# Patient Record
Sex: FEMALE | Race: WHITE | NOT HISPANIC OR LATINO | Employment: FULL TIME | URBAN - METROPOLITAN AREA
[De-identification: names, ages, dates, MRNs, and addresses within clinical notes are randomized per-mention and may not be internally consistent; named-entity substitution may affect disease eponyms.]

---

## 2017-08-04 ENCOUNTER — TRANSCRIBE ORDERS (OUTPATIENT)
Dept: ULTRASOUND IMAGING | Facility: HOSPITAL | Age: 42
End: 2017-08-04

## 2017-08-04 DIAGNOSIS — Z12.31 VISIT FOR SCREENING MAMMOGRAM: Primary | ICD-10-CM

## 2017-08-18 ENCOUNTER — HOSPITAL ENCOUNTER (OUTPATIENT)
Dept: MAMMOGRAPHY | Facility: HOSPITAL | Age: 42
Discharge: HOME | End: 2017-08-18
Attending: OBSTETRICS & GYNECOLOGY | Admitting: OBSTETRICS & GYNECOLOGY

## 2017-08-18 DIAGNOSIS — Z12.31 VISIT FOR SCREENING MAMMOGRAM: ICD-10-CM

## 2017-08-18 PROCEDURE — 77063 BREAST TOMOSYNTHESIS BI: CPT

## 2017-08-18 PROCEDURE — 77067 SCR MAMMO BI INCL CAD: CPT | Performed by: RADIOLOGY

## 2017-08-18 PROCEDURE — G0202 SCR MAMMO BI INCL CAD: HCPCS

## 2017-08-18 PROCEDURE — 77063 BREAST TOMOSYNTHESIS BI: CPT | Performed by: RADIOLOGY

## 2018-07-17 ENCOUNTER — TRANSCRIBE ORDERS (OUTPATIENT)
Dept: ADMINISTRATIVE | Facility: HOSPITAL | Age: 43
End: 2018-07-17

## 2018-07-17 DIAGNOSIS — Z12.31 VISIT FOR SCREENING MAMMOGRAM: Primary | ICD-10-CM

## 2018-08-27 ENCOUNTER — HOSPITAL ENCOUNTER (OUTPATIENT)
Dept: MAMMOGRAPHY | Facility: HOSPITAL | Age: 43
Discharge: HOME | End: 2018-08-27
Attending: OBSTETRICS & GYNECOLOGY | Admitting: OBSTETRICS & GYNECOLOGY

## 2018-08-27 DIAGNOSIS — Z12.31 VISIT FOR SCREENING MAMMOGRAM: ICD-10-CM

## 2018-08-27 PROCEDURE — 77063 BREAST TOMOSYNTHESIS BI: CPT | Performed by: RADIOLOGY

## 2018-08-27 PROCEDURE — 77063 BREAST TOMOSYNTHESIS BI: CPT

## 2018-08-27 PROCEDURE — 77067 SCR MAMMO BI INCL CAD: CPT | Performed by: RADIOLOGY

## 2018-08-27 PROCEDURE — 77067 SCR MAMMO BI INCL CAD: CPT

## 2019-07-29 ENCOUNTER — TRANSCRIBE ORDERS (OUTPATIENT)
Dept: ADMINISTRATIVE | Facility: HOSPITAL | Age: 44
End: 2019-07-29

## 2019-07-29 DIAGNOSIS — Z12.31 VISIT FOR SCREENING MAMMOGRAM: Primary | ICD-10-CM

## 2019-09-27 ENCOUNTER — HOSPITAL ENCOUNTER (OUTPATIENT)
Dept: MAMMOGRAPHY | Facility: HOSPITAL | Age: 44
Discharge: HOME | End: 2019-09-27
Admitting: OBSTETRICS & GYNECOLOGY

## 2019-09-27 DIAGNOSIS — Z12.31 VISIT FOR SCREENING MAMMOGRAM: ICD-10-CM

## 2019-09-27 PROCEDURE — 77067 SCR MAMMO BI INCL CAD: CPT

## 2019-09-27 PROCEDURE — 77063 BREAST TOMOSYNTHESIS BI: CPT

## 2019-09-27 PROCEDURE — 77067 SCR MAMMO BI INCL CAD: CPT | Performed by: RADIOLOGY

## 2019-09-27 PROCEDURE — 77063 BREAST TOMOSYNTHESIS BI: CPT | Performed by: RADIOLOGY

## 2020-08-19 ENCOUNTER — TRANSCRIBE ORDERS (OUTPATIENT)
Dept: ADMINISTRATIVE | Facility: HOSPITAL | Age: 45
End: 2020-08-19

## 2020-08-19 DIAGNOSIS — Z12.31 VISIT FOR SCREENING MAMMOGRAM: Primary | ICD-10-CM

## 2020-08-21 ENCOUNTER — TRANSCRIBE ORDERS (OUTPATIENT)
Dept: ADMINISTRATIVE | Facility: HOSPITAL | Age: 45
End: 2020-08-21

## 2020-12-10 ENCOUNTER — APPOINTMENT (OUTPATIENT)
Dept: MAMMOGRAPHY | Facility: HOSPITAL | Age: 45
End: 2020-12-10

## 2021-03-01 ENCOUNTER — OFFICE VISIT (OUTPATIENT)
Dept: OBSTETRICS AND GYNECOLOGY | Facility: CLINIC | Age: 46
End: 2021-03-01

## 2021-03-01 VITALS
SYSTOLIC BLOOD PRESSURE: 120 MMHG | DIASTOLIC BLOOD PRESSURE: 72 MMHG | BODY MASS INDEX: 28.56 KG/M2 | WEIGHT: 182 LBS | HEIGHT: 67 IN

## 2021-03-01 DIAGNOSIS — N92.6 IRREGULAR PERIODS: ICD-10-CM

## 2021-03-01 DIAGNOSIS — Z01.419 WOMEN'S ANNUAL ROUTINE GYNECOLOGICAL EXAMINATION: Primary | ICD-10-CM

## 2021-03-01 DIAGNOSIS — Z80.3 FAMILY HISTORY OF BREAST CANCER: ICD-10-CM

## 2021-03-01 DIAGNOSIS — Z30.011 ENCOUNTER FOR INITIAL PRESCRIPTION OF CONTRACEPTIVE PILLS: ICD-10-CM

## 2021-03-01 DIAGNOSIS — Z12.31 BREAST CANCER SCREENING BY MAMMOGRAM: ICD-10-CM

## 2021-03-01 PROBLEM — Z97.5 IUD (INTRAUTERINE DEVICE) IN PLACE: Status: ACTIVE | Noted: 2021-03-01

## 2021-03-01 PROBLEM — Z86.69 HISTORY OF MIGRAINE WITH AURA: Status: ACTIVE | Noted: 2021-03-01

## 2021-03-01 PROBLEM — Z97.5 IUD (INTRAUTERINE DEVICE) IN PLACE: Status: RESOLVED | Noted: 2021-03-01 | Resolved: 2021-03-01

## 2021-03-01 PROCEDURE — 99396 PREV VISIT EST AGE 40-64: CPT | Performed by: OBSTETRICS & GYNECOLOGY

## 2021-03-01 PROCEDURE — 99213 OFFICE O/P EST LOW 20 MIN: CPT | Performed by: OBSTETRICS & GYNECOLOGY

## 2021-03-01 RX ORDER — LEVOTHYROXINE SODIUM 100 UG/1
100 TABLET ORAL DAILY
COMMUNITY
Start: 2021-02-11 | End: 2022-06-06

## 2021-03-01 RX ORDER — DROSPIRENONE 4 MG/1
1 TABLET, FILM COATED ORAL DAILY
Qty: 84 TABLET | Refills: 4 | Status: SHIPPED | OUTPATIENT
Start: 2021-03-01 | End: 2022-03-02 | Stop reason: SDUPTHER

## 2021-03-01 RX ORDER — OMEPRAZOLE 40 MG/1
CAPSULE, DELAYED RELEASE ORAL
COMMUNITY
Start: 2021-02-11 | End: 2022-06-21 | Stop reason: SDUPTHER

## 2021-03-01 RX ORDER — ERGOCALCIFEROL 1.25 MG/1
50000 CAPSULE ORAL WEEKLY
COMMUNITY
Start: 2021-02-11 | End: 2022-06-17

## 2021-03-01 NOTE — PROGRESS NOTES
GYN Annual Exam     CC - Here for annual exam.        HPI  Aleksandra Raymond is a 45 y.o. female, , who presents for annual well woman exam. Patient's last menstrual period was 2021.  Periods are irregular occurring every 3-8 weeks, lasting 5 days. Heavy for the first 3 days.  Dysmenorrhea:severe, occurring first 1-2 days of flow.  Patient reports problems with: irregular periods and dysmenorrhea. She also wants to discuss an ingrown hair in the pelvic region that she can't get rid of. She states her PCP has evaluated it in Dec but it is still there. There were no changes to her medical or surgical history since her last visit.. Partner Status: Marital Status: .  New Partners since last visit: no.  Desires STD Screening: no.    Additional OB/GYN History   Current contraception: contraceptive methods: Withdrawal   Desires to: start contraception. She has a history of Migraines. She was on Kyleena but was removed last year due to AUB. She is interested in slynd  Last Pap :   Last Completed Pap Smear       Status Date      PAP SMEAR Done 2020 negative        History of abnormal Pap smear: yes - a long time ago  Family history of uterine, colon, breast, or ovarian cancer: yes - mother and maternal aunt had breast cancer  Performs monthly Self-Breast Exam: no  Last mammogram:   Last Completed Mammogram     Patient has no health maintenance due at this time         Exercises Regularly: no  Feelings of Anxiety or Depression: no  Tobacco Usage?: No   OB History        1    Para   1    Term   1            AB        Living           SAB        TAB        Ectopic        Molar        Multiple        Live Births                    Health Maintenance   Topic Date Due   • Annual Gynecologic Pelvic and Breast Exam  1973   • COLONOSCOPY  1973   • ANNUAL PHYSICAL  1976   • TDAP/TD VACCINES (1 - Tdap) 1992   • INFLUENZA VACCINE  2020   • HEPATITIS C SCREENING   "03/01/2021   • PAP SMEAR  02/26/2023   • Pneumococcal Vaccine 0-64  Aged Out   • MENINGOCOCCAL VACCINE  Aged Out       The additional following portions of the patient's history were reviewed and updated as appropriate: allergies, current medications, past family history, past medical history, past social history, past surgical history and problem list.    Review of Systems   Constitutional: Negative.    HENT: Negative.    Eyes: Negative.    Respiratory: Negative.    Cardiovascular: Negative.    Gastrointestinal: Negative.    Endocrine: Negative.    Genitourinary: Positive for menstrual problem.   Musculoskeletal: Negative.    Skin: Positive for skin lesions.   Allergic/Immunologic: Negative.    Neurological: Negative.    Hematological: Negative.    Psychiatric/Behavioral: Negative.      All other systems reviewed and are negative.     I have reviewed and agree with the HPI, ROS, and historical information as entered above. Elda Byrnes MD    Objective   /72   Ht 170.2 cm (67\")   Wt 82.6 kg (182 lb)   LMP 01/23/2021   BMI 28.51 kg/m²     Physical Exam  Vitals signs and nursing note reviewed. Exam conducted with a chaperone present.   Constitutional:       Appearance: She is well-developed.   HENT:      Head: Normocephalic and atraumatic.   Neck:      Musculoskeletal: Normal range of motion. No muscular tenderness.      Thyroid: No thyroid mass or thyromegaly.   Cardiovascular:      Rate and Rhythm: Normal rate and regular rhythm.      Heart sounds: No murmur.   Pulmonary:      Effort: Pulmonary effort is normal. No retractions.      Breath sounds: Normal breath sounds. No wheezing, rhonchi or rales.   Chest:      Chest wall: No mass or tenderness.      Breasts:         Right: Normal. No mass, nipple discharge, skin change or tenderness.         Left: Normal. No mass, nipple discharge, skin change or tenderness.   Abdominal:      General: Bowel sounds are normal.      Palpations: Abdomen is soft. " Abdomen is not rigid. There is no mass.      Tenderness: There is no abdominal tenderness. There is no guarding.      Hernia: No hernia is present. There is no hernia in the left inguinal area or right inguinal area.   Genitourinary:     General: Normal vulva.      Exam position: Lithotomy position.      Pubic Area: No rash.       Labia:         Right: No rash, tenderness or lesion.         Left: No rash, tenderness or lesion.       Urethra: No urethral pain or urethral swelling.      Vagina: Normal. No vaginal discharge or lesions.      Cervix: No cervical motion tenderness, discharge, lesion or cervical bleeding.      Uterus: Normal. Not enlarged, not fixed and not tender.       Adnexa:         Right: No mass, tenderness or fullness.          Left: No mass, tenderness or fullness.        Rectum: No external hemorrhoid.          Comments: Scarred area from history of ingrown pubic hair.  No erythema or drainage today.  Neurological:      Mental Status: She is alert and oriented to person, place, and time.   Psychiatric:         Behavior: Behavior normal.            Assessment and Plan    Problem List Items Addressed This Visit        Family History    Family history of breast cancer    Overview     Diagnosed in patient's mother at age 48 and then patient's maternal aunt and age 52  Patient has mammograms at [facility]            Genitourinary and Reproductive     Women's annual routine gynecological examination - Primary    Overview     3/1/2021-Pap smear completed         Relevant Orders    Pap IG, HPV-hr    Irregular periods    Overview     Patient reports she had oligomenorrhea as a teenager up until about age 25.  She required Clomid to get pregnant.  Following that she had a D&C for a miscarriage at age 27.  From that time on she has had regular cycles until this past year.  She does have a history of thyroid dysfunction for which she is on medicine however it has not been tested since June.  We  will get labs today and cycle on progestin only pills secondary to history of migraine with aura.In 2020 patient developed irregular periods ranging from 3 to 8 weeks apart.         Relevant Medications    Drospirenone (Slynd) 4 MG tablet    Other Relevant Orders    TSH    Prolactin    Testosterone    Hemoglobin A1c    CBC & Differential      Other Visit Diagnoses     Breast cancer screening by mammogram        Relevant Orders    Mammo Screening Digital Tomosynthesis Bilateral With CAD    Encounter for initial prescription of contraceptive pills        Relevant Medications    Drospirenone (Slynd) 4 MG tablet          1. GYN annual well woman exam.   2. Encouraged use of condoms for STD prevention.  3. Fibrocystic breast changes - Encouraged decreasing caffeine, supportive bra, low dose vitamin E supplementation.  4. Reviewed monthly self breast exams.  Instructed to call with lumps, pain, or breast discharge.    5. Ordered Mammogram today  6. Recommended use of Vitamin D replacement and getting adequate calcium in her diet. (1500mg)  7. Reviewed BMI and weight loss as preventative health measures.   8. Reviewed exercise as a preventative health measures.   9. Reccommended Flu Vaccine in Fall of each year.  10. Symptoms of menopausal transition reviewed with patient.     Elda Byrnes MD  03/01/2021

## 2021-03-02 ENCOUNTER — HOSPITAL ENCOUNTER (OUTPATIENT)
Dept: MAMMOGRAPHY | Facility: HOSPITAL | Age: 46
Discharge: HOME | End: 2021-03-02
Admitting: OBSTETRICS & GYNECOLOGY

## 2021-03-02 ENCOUNTER — TELEPHONE (OUTPATIENT)
Dept: OBSTETRICS AND GYNECOLOGY | Facility: CLINIC | Age: 46
End: 2021-03-02

## 2021-03-02 DIAGNOSIS — Z12.31 VISIT FOR SCREENING MAMMOGRAM: ICD-10-CM

## 2021-03-02 LAB
BASOPHILS # BLD AUTO: 0.09 10*3/MM3 (ref 0–0.2)
BASOPHILS NFR BLD AUTO: 0.8 % (ref 0–1.5)
EOSINOPHIL # BLD AUTO: 0.22 10*3/MM3 (ref 0–0.4)
EOSINOPHIL NFR BLD AUTO: 2 % (ref 0.3–6.2)
ERYTHROCYTE [DISTWIDTH] IN BLOOD BY AUTOMATED COUNT: 13.8 % (ref 12.3–15.4)
HBA1C MFR BLD: 6.8 % (ref 4.8–5.6)
HCT VFR BLD AUTO: 38.3 % (ref 34–46.6)
HGB BLD-MCNC: 13.2 G/DL (ref 12–15.9)
IMM GRANULOCYTES # BLD AUTO: 0.12 10*3/MM3 (ref 0–0.05)
IMM GRANULOCYTES NFR BLD AUTO: 1.1 % (ref 0–0.5)
LYMPHOCYTES # BLD AUTO: 3.53 10*3/MM3 (ref 0.7–3.1)
LYMPHOCYTES NFR BLD AUTO: 32.4 % (ref 19.6–45.3)
MCH RBC QN AUTO: 28.4 PG (ref 26.6–33)
MCHC RBC AUTO-ENTMCNC: 34.5 G/DL (ref 31.5–35.7)
MCV RBC AUTO: 82.4 FL (ref 79–97)
MONOCYTES # BLD AUTO: 0.71 10*3/MM3 (ref 0.1–0.9)
MONOCYTES NFR BLD AUTO: 6.5 % (ref 5–12)
NEUTROPHILS # BLD AUTO: 6.24 10*3/MM3 (ref 1.7–7)
NEUTROPHILS NFR BLD AUTO: 57.2 % (ref 42.7–76)
NRBC BLD AUTO-RTO: 0 /100 WBC (ref 0–0.2)
PLATELET # BLD AUTO: 278 10*3/MM3 (ref 140–450)
PROLACTIN SERPL-MCNC: 7.5 NG/ML (ref 4.8–23.3)
RBC # BLD AUTO: 4.65 10*6/MM3 (ref 3.77–5.28)
TESTOST SERPL-MCNC: <3 NG/DL (ref 8–48)
TSH SERPL DL<=0.005 MIU/L-ACNC: 4.28 UIU/ML (ref 0.27–4.2)
WBC # BLD AUTO: 10.91 10*3/MM3 (ref 3.4–10.8)

## 2021-03-02 PROCEDURE — 77063 BREAST TOMOSYNTHESIS BI: CPT | Performed by: RADIOLOGY

## 2021-03-02 PROCEDURE — 77067 SCR MAMMO BI INCL CAD: CPT | Performed by: RADIOLOGY

## 2021-03-02 PROCEDURE — 77063 BREAST TOMOSYNTHESIS BI: CPT

## 2021-03-02 PROCEDURE — 77067 SCR MAMMO BI INCL CAD: CPT

## 2021-03-02 NOTE — TELEPHONE ENCOUNTER
Patient received call about labs with morning but was not able to answer. Returning call to go over the results with a nurse.

## 2021-03-02 NOTE — TELEPHONE ENCOUNTER
Reviewed recent labs with patient. She will follow up with pcp.     She has access to my chart to view results.

## 2021-03-02 NOTE — PROGRESS NOTES
Explained to patient that her labs are consistent with diabetes and concern for thyroid dysfunction as well.  She needs to follow-up with her primary care physician.  Please send a copy of her labs to her PCP.

## 2021-03-04 DIAGNOSIS — Z01.419 WOMEN'S ANNUAL ROUTINE GYNECOLOGICAL EXAMINATION: ICD-10-CM

## 2021-03-16 ENCOUNTER — APPOINTMENT (OUTPATIENT)
Dept: MAMMOGRAPHY | Facility: HOSPITAL | Age: 46
End: 2021-03-16

## 2021-05-14 ENCOUNTER — HOSPITAL ENCOUNTER (OUTPATIENT)
Dept: ULTRASOUND IMAGING | Facility: HOSPITAL | Age: 46
Discharge: HOME | End: 2021-05-14

## 2021-05-14 ENCOUNTER — HOSPITAL ENCOUNTER (OUTPATIENT)
Dept: MAMMOGRAPHY | Facility: HOSPITAL | Age: 46
Discharge: HOME | End: 2021-05-14

## 2021-05-14 DIAGNOSIS — R92.8 ABNORMAL MAMMOGRAM: ICD-10-CM

## 2021-05-14 PROCEDURE — 77062 BREAST TOMOSYNTHESIS BI: CPT | Performed by: RADIOLOGY

## 2021-05-14 PROCEDURE — G0279 TOMOSYNTHESIS, MAMMO: HCPCS

## 2021-05-14 PROCEDURE — 77066 DX MAMMO INCL CAD BI: CPT

## 2021-05-14 PROCEDURE — 76642 ULTRASOUND BREAST LIMITED: CPT

## 2021-05-14 PROCEDURE — 77066 DX MAMMO INCL CAD BI: CPT | Performed by: RADIOLOGY

## 2021-05-14 PROCEDURE — 76642 ULTRASOUND BREAST LIMITED: CPT | Performed by: RADIOLOGY

## 2022-03-02 ENCOUNTER — OFFICE VISIT (OUTPATIENT)
Dept: OBSTETRICS AND GYNECOLOGY | Facility: CLINIC | Age: 47
End: 2022-03-02

## 2022-03-02 VITALS
HEIGHT: 67 IN | BODY MASS INDEX: 24.01 KG/M2 | SYSTOLIC BLOOD PRESSURE: 106 MMHG | DIASTOLIC BLOOD PRESSURE: 70 MMHG | WEIGHT: 153 LBS

## 2022-03-02 DIAGNOSIS — N92.6 IRREGULAR PERIODS: ICD-10-CM

## 2022-03-02 DIAGNOSIS — Z12.31 BREAST CANCER SCREENING BY MAMMOGRAM: ICD-10-CM

## 2022-03-02 DIAGNOSIS — Z01.419 WOMEN'S ANNUAL ROUTINE GYNECOLOGICAL EXAMINATION: Primary | ICD-10-CM

## 2022-03-02 DIAGNOSIS — Z30.011 ENCOUNTER FOR INITIAL PRESCRIPTION OF CONTRACEPTIVE PILLS: ICD-10-CM

## 2022-03-02 PROCEDURE — 99396 PREV VISIT EST AGE 40-64: CPT | Performed by: OBSTETRICS & GYNECOLOGY

## 2022-03-02 PROCEDURE — 99213 OFFICE O/P EST LOW 20 MIN: CPT | Performed by: OBSTETRICS & GYNECOLOGY

## 2022-03-02 RX ORDER — DROSPIRENONE 4 MG/1
1 TABLET, FILM COATED ORAL DAILY
Qty: 84 TABLET | Refills: 4 | Status: SHIPPED | OUTPATIENT
Start: 2022-03-02 | End: 2023-03-07 | Stop reason: SDUPTHER

## 2022-03-02 NOTE — PROGRESS NOTES
GYN Annual Exam     CC - Here for annual exam.        HPI  Aleksandra Raymond is a 46 y.o. female, , who presents for annual well woman exam.  She is perimenopausal.  Periods are rare, lasting 4 days. .  Dysmenorrhea:none..  Patient reports problems with: sleeplessness and night sweats. There were no changes to her medical or surgical history since her last visit.. Partner Status: Marital Status: .  New Partners since last visit: no.      Additional OB/GYN History   Current contraception: contraceptive methods: Oral progesterone-only contraceptive  Desires to: continue contraception    Last Pap : 3/4/21. Results: negative  Last Completed Pap Smear          PAP SMEAR (Every 3 Years) Next due on 3/4/2024    2021  Pap IG, HPV-hr    2020  Done - negative              History of abnormal Pap smear: yes - years ago - patient states an ablation was done  Family history of uterine, colon, breast, or ovarian cancer: yes - mother and maternal aunt had breast cancer  Performs monthly Self-Breast Exam: no  Last mammogram: 21. Done at [facility]. Normal    Last Completed Mammogram     This patient has no relevant Health Maintenance data.        Last colonoscopy:   Last Completed Colonoscopy     This patient has no relevant Health Maintenance data.        Last DEXA: None  Exercises Regularly: yes  Feelings of Anxiety or Depression: no      Tobacco Usage?: No   OB History        1    Para   1    Term   1            AB        Living           SAB        IAB        Ectopic        Molar        Multiple        Live Births                    Health Maintenance   Topic Date Due   • COLORECTAL CANCER SCREENING  Never done   • ANNUAL PHYSICAL  Never done   • TDAP/TD VACCINES (1 - Tdap) Never done   • HEPATITIS C SCREENING  Never done   • INFLUENZA VACCINE  Never done   • Annual Gynecologic Pelvic and Breast Exam  2022   • PAP SMEAR  2024   • COVID-19 Vaccine  Completed   • Pneumococcal  "Vaccine 0-64  Aged Out       The additional following portions of the patient's history were reviewed and updated as appropriate: allergies, current medications, past family history, past medical history, past social history, past surgical history and problem list.    Review of Systems   Constitutional: Negative.    HENT: Negative.    Eyes: Negative.    Respiratory: Negative.    Cardiovascular: Negative.    Gastrointestinal: Negative.    Endocrine: Negative.    Genitourinary: Negative.    Musculoskeletal: Negative.    Skin: Negative.    Allergic/Immunologic: Negative.    Neurological: Negative.    Hematological: Negative.    Psychiatric/Behavioral: Positive for sleep disturbance.       I have reviewed and agree with the HPI, ROS, and historical information as entered above. Elda Byrnes MD    Objective   /70   Ht 170.2 cm (67\")   Wt 69.4 kg (153 lb)   LMP  (LMP Unknown)   Breastfeeding No   BMI 23.96 kg/m²     Physical Exam  Vitals and nursing note reviewed. Exam conducted with a chaperone present.   Constitutional:       Appearance: She is well-developed.   HENT:      Head: Normocephalic and atraumatic.   Neck:      Thyroid: No thyroid mass or thyromegaly.   Cardiovascular:      Rate and Rhythm: Normal rate and regular rhythm.      Heart sounds: No murmur heard.      Pulmonary:      Effort: Pulmonary effort is normal. No retractions.      Breath sounds: Normal breath sounds. No wheezing, rhonchi or rales.   Chest:      Chest wall: No mass or tenderness.   Breasts:      Right: Normal. No mass, nipple discharge, skin change or tenderness.      Left: Normal. No mass, nipple discharge, skin change or tenderness.       Abdominal:      General: Bowel sounds are normal.      Palpations: Abdomen is soft. Abdomen is not rigid. There is no mass.      Tenderness: There is no abdominal tenderness. There is no guarding.      Hernia: No hernia is present. There is no hernia in the left inguinal area or right " inguinal area.   Genitourinary:     General: Normal vulva.      Exam position: Lithotomy position.      Pubic Area: No rash.       Labia:         Right: No rash, tenderness or lesion.         Left: No rash, tenderness or lesion.       Urethra: No urethral pain or urethral swelling.      Vagina: Normal. No vaginal discharge or lesions.      Cervix: No cervical motion tenderness, discharge, lesion or cervical bleeding.      Uterus: Normal. Not enlarged, not fixed and not tender.       Adnexa:         Right: No mass, tenderness or fullness.          Left: No mass, tenderness or fullness.        Rectum: No external hemorrhoid.   Musculoskeletal:      Cervical back: Normal range of motion. No muscular tenderness.   Neurological:      Mental Status: She is alert and oriented to person, place, and time.   Psychiatric:         Behavior: Behavior normal.            Assessment and Plan    Problem List Items Addressed This Visit        Genitourinary and Reproductive     Women's annual routine gynecological examination - Primary    Overview     3/1/2021-Pap smear completed         Irregular periods    Overview     Patient reports she had oligomenorrhea as a teenager up until about age 25.  She required Clomid to get pregnant.  Following that she had a D&C for a miscarriage at age 27.  From that time on she has had regular cycles until this past year.  She does have a history of thyroid dysfunction for which she is on medicine however it has not been tested since June.  We will get labs today and cycle on progestin only pills secondary to history of migraine with aura.In 2020 patient developed irregular periods ranging from 3 to 8 weeks apart.         Relevant Medications    Drospirenone (Slynd) 4 MG tablet      Other Visit Diagnoses     Breast cancer screening by mammogram        Relevant Orders    Mammo Screening Digital Tomosynthesis Bilateral With CAD    Encounter for initial prescription of contraceptive pills         Relevant Medications    Drospirenone (Slynd) 4 MG tablet        We discussed patient sleep disturbance.  She notices that she has no problems going to sleep however wakes up several times during the night.  It is oftentimes associated with hot flashes.  We discussed behavioral changes to help with this.  This includes avoiding down and memory foam, sleeping and silk or polyester.  We also discussed avoidance of alcohol and caffeine.  We discussed keeping the thermostat at 65 degrees and using a fan.  We discussed the calm karol to help with the anxiety associated with difficulty falling back to sleep.  We discussed black cohosh to help and considering Effexor.  Patient would not like a prescription medicine at this time.  She is not a candidate for estrogen secondary to her history of migraines with aura.  1. GYN annual well woman exam.   2. Reviewed monthly self breast exams.  Instructed to call with lumps, pain, or breast discharge.  Yearly mammograms ordered.  3. Ordered mammogram today.  4. Recommended use of Vitamin D and getting adequate calcium in her diet. (1500mg)  5. Symptoms of menopausal transition reviewed with patient.   6. Reccommended Flu Vaccine in Fall of each year.  7. RTC in 1 year or PRN with problems.  8. Return in about 1 year (around 3/2/2023) for Annual physical.     Elda Byrnes MD  03/02/2022

## 2022-05-16 ENCOUNTER — HOSPITAL ENCOUNTER (OUTPATIENT)
Dept: MAMMOGRAPHY | Facility: HOSPITAL | Age: 47
Discharge: HOME | End: 2022-05-16
Admitting: OBSTETRICS & GYNECOLOGY

## 2022-05-16 DIAGNOSIS — Z12.31 BREAST CANCER SCREENING BY MAMMOGRAM: ICD-10-CM

## 2022-05-16 PROCEDURE — 77067 SCR MAMMO BI INCL CAD: CPT | Performed by: RADIOLOGY

## 2022-05-16 PROCEDURE — 77063 BREAST TOMOSYNTHESIS BI: CPT

## 2022-05-16 PROCEDURE — 77063 BREAST TOMOSYNTHESIS BI: CPT | Performed by: RADIOLOGY

## 2022-05-16 PROCEDURE — 77067 SCR MAMMO BI INCL CAD: CPT

## 2022-06-05 ENCOUNTER — PATIENT MESSAGE (OUTPATIENT)
Dept: FAMILY MEDICINE CLINIC | Facility: CLINIC | Age: 47
End: 2022-06-05

## 2022-06-06 RX ORDER — LEVOTHYROXINE SODIUM 0.1 MG/1
100 TABLET ORAL DAILY
Qty: 30 TABLET | Refills: 0 | Status: SHIPPED | OUTPATIENT
Start: 2022-06-06 | End: 2022-06-21 | Stop reason: SDUPTHER

## 2022-06-06 RX ORDER — LEVOTHYROXINE SODIUM 0.1 MG/1
100 TABLET ORAL DAILY
COMMUNITY
End: 2022-06-06 | Stop reason: SDUPTHER

## 2022-06-17 ENCOUNTER — OFFICE VISIT (OUTPATIENT)
Dept: FAMILY MEDICINE CLINIC | Facility: CLINIC | Age: 47
End: 2022-06-17

## 2022-06-17 VITALS
BODY MASS INDEX: 24.48 KG/M2 | DIASTOLIC BLOOD PRESSURE: 64 MMHG | WEIGHT: 156 LBS | HEIGHT: 67 IN | SYSTOLIC BLOOD PRESSURE: 120 MMHG

## 2022-06-17 DIAGNOSIS — R73.01 ELEVATED FASTING GLUCOSE: ICD-10-CM

## 2022-06-17 DIAGNOSIS — Z13.220 LIPID SCREENING: ICD-10-CM

## 2022-06-17 DIAGNOSIS — Z00.00 WELL ADULT EXAM: Primary | ICD-10-CM

## 2022-06-17 DIAGNOSIS — E55.9 VITAMIN D DEFICIENCY: ICD-10-CM

## 2022-06-17 DIAGNOSIS — E03.9 ACQUIRED HYPOTHYROIDISM: ICD-10-CM

## 2022-06-17 PROCEDURE — 99396 PREV VISIT EST AGE 40-64: CPT | Performed by: STUDENT IN AN ORGANIZED HEALTH CARE EDUCATION/TRAINING PROGRAM

## 2022-06-17 PROCEDURE — 36415 COLL VENOUS BLD VENIPUNCTURE: CPT | Performed by: STUDENT IN AN ORGANIZED HEALTH CARE EDUCATION/TRAINING PROGRAM

## 2022-06-17 NOTE — PROGRESS NOTES
"Chief Complaint  Annual Exam (Also not sleeping well)    Subjective          Aleksandra Raymond presents to [facility]  History of Present Illness    Patient states that overall she is doing well at this time.  She is due for blood work, and is tolerating her levothyroxine well.     She is seen OB/GYN and was told that she is menopausal, and that she should try to take over-the-counter black cohosh for her hot flashes and insomnia.  She states that hot flashes have improved, but she continues to have insomnia.  She is using some over-the-counter medications with improvement.  Continues to take Slynd for birth control at this time and this is managed by GYN.    She is due for blood work at this time.    Care gaps discussed at this visit.          Objective   Vital Signs:   /64 (BP Location: Left arm, Patient Position: Sitting, Cuff Size: Adult)   Ht 170.2 cm (67\")   Wt 70.8 kg (156 lb)   BMI 24.43 kg/m²     Body mass index is 24.43 kg/m².    Review of Systems    Past History:  Medical History: has a past medical history of Acid reflux, Anxiety syndrome, Dysmenorrhea, History of Chiari malformation, Hypothyroid, Migraine, and Tonsillitis.   Surgical History: has a past surgical history that includes Intrauterine device insertion (02/27/2019) and Tonsillectomy.   Family History: family history includes Breast cancer (age of onset: 47) in her mother; Breast cancer (age of onset: 52) in her maternal aunt; Diabetes in her brother; Hypertension in her mother; Pancreatic cancer in her father; Prostate cancer in her father.   Social History: reports that she has never smoked. She has never used smokeless tobacco. She reports current alcohol use. She reports that she does not use drugs.      Current Outpatient Medications:   •  Drospirenone (Slynd) 4 MG tablet, Take 1 tablet by mouth Daily., Disp: 84 tablet, Rfl: 4  •  levothyroxine (SYNTHROID, LEVOTHROID) 100 MCG tablet, Take 1 tablet by " mouth Daily., Disp: 30 tablet, Rfl: 0  •  omeprazole (priLOSEC) 40 MG capsule, TAKE 1 CAPSULE BY MOUTH ONCE DAILY 30 MINUTES BEFORE FIRST MEAL OF THE DAY, Disp: , Rfl:     Allergies: Patient has no known allergies.    Physical Exam  Constitutional:       General: She is not in acute distress.     Appearance: Normal appearance. She is not ill-appearing or toxic-appearing.   HENT:      Head: Normocephalic and atraumatic.      Right Ear: Tympanic membrane and ear canal normal.      Left Ear: Tympanic membrane and ear canal normal.   Cardiovascular:      Rate and Rhythm: Normal rate and regular rhythm.      Heart sounds: No murmur heard.    No gallop.   Pulmonary:      Effort: Pulmonary effort is normal.      Breath sounds: Normal breath sounds.   Abdominal:      General: Abdomen is flat.      Palpations: Abdomen is soft.      Tenderness: There is no abdominal tenderness. There is no guarding.   Musculoskeletal:      Right lower leg: No edema.      Left lower leg: No edema.   Lymphadenopathy:      Cervical: No cervical adenopathy.   Neurological:      General: No focal deficit present.      Mental Status: She is alert and oriented to person, place, and time.   Psychiatric:         Mood and Affect: Mood normal.         Thought Content: Thought content normal.          Result Review :                   Assessment and Plan    Diagnoses and all orders for this visit:    1. Well adult exam (Primary)  -     Comprehensive Metabolic Panel; Future  -     CBC & Differential; Future  -     Lipid Panel; Future  -     TSH; Future  -     T4, Free; Future  -     Vitamin D 25 Hydroxy; Future  -     Comprehensive Metabolic Panel  -     CBC & Differential  -     Lipid Panel  -     TSH  -     T4, Free  -     Vitamin D 25 Hydroxy    2. Acquired hypothyroidism  -     Comprehensive Metabolic Panel; Future  -     CBC & Differential; Future  -     Lipid Panel; Future  -     TSH; Future  -     T4, Free; Future  -     Vitamin D 25 Hydroxy;  Future  -     Comprehensive Metabolic Panel  -     CBC & Differential  -     Lipid Panel  -     TSH  -     T4, Free  -     Vitamin D 25 Hydroxy    3. Lipid screening  -     Comprehensive Metabolic Panel; Future  -     CBC & Differential; Future  -     Lipid Panel; Future  -     TSH; Future  -     T4, Free; Future  -     Vitamin D 25 Hydroxy; Future  -     Comprehensive Metabolic Panel  -     CBC & Differential  -     Lipid Panel  -     TSH  -     T4, Free  -     Vitamin D 25 Hydroxy    4. Elevated fasting glucose  -     Comprehensive Metabolic Panel; Future  -     CBC & Differential; Future  -     Lipid Panel; Future  -     TSH; Future  -     T4, Free; Future  -     Vitamin D 25 Hydroxy; Future  -     Comprehensive Metabolic Panel  -     CBC & Differential  -     Lipid Panel  -     TSH  -     T4, Free  -     Vitamin D 25 Hydroxy    5. Vitamin D deficiency  -     Comprehensive Metabolic Panel; Future  -     CBC & Differential; Future  -     Lipid Panel; Future  -     TSH; Future  -     T4, Free; Future  -     Vitamin D 25 Hydroxy; Future  -     Comprehensive Metabolic Panel  -     CBC & Differential  -     Lipid Panel  -     TSH  -     T4, Free  -     Vitamin D 25 Hydroxy      Overall she is doing well at this time.  Care gaps discussed and addressed at today's visit.  Blood work ordered and will contact with results.  Discussed using over-the-counter medications for sleep as well as good sleep hygiene to help with her poor sleep that is likely associated with premenopausal state.  Follow-up in 1 year or sooner with any new or worsening symptoms, or with lab abnormalities.    Past medical and surgical history as well as allergies, family history and social history were reviewed, and discussed with patient.  Chronic conditions were reviewed as well as medications.   Anticipatory guidance discussed at this visit, and patient was able to ask questions and discuss any concerns.        Follow Up   No follow-ups on  file.  Patient was given instructions and counseling regarding her condition or for health maintenance advice. Please see specific information pulled into the AVS if appropriate.     Harriett Escamilla, DO

## 2022-06-18 LAB
25(OH)D3+25(OH)D2 SERPL-MCNC: 57.5 NG/ML (ref 30–100)
ALBUMIN SERPL-MCNC: 4.8 G/DL (ref 3.8–4.8)
ALBUMIN/GLOB SERPL: 1.7 {RATIO} (ref 1.2–2.2)
ALP SERPL-CCNC: 78 IU/L (ref 44–121)
ALT SERPL-CCNC: 8 IU/L (ref 0–32)
AST SERPL-CCNC: 15 IU/L (ref 0–40)
BASOPHILS # BLD AUTO: 0.1 X10E3/UL (ref 0–0.2)
BASOPHILS NFR BLD AUTO: 1 %
BILIRUB SERPL-MCNC: 0.4 MG/DL (ref 0–1.2)
BUN SERPL-MCNC: 14 MG/DL (ref 6–24)
BUN/CREAT SERPL: 15 (ref 9–23)
CALCIUM SERPL-MCNC: 9.1 MG/DL (ref 8.7–10.2)
CHLORIDE SERPL-SCNC: 100 MMOL/L (ref 96–106)
CHOLEST SERPL-MCNC: 220 MG/DL (ref 100–199)
CO2 SERPL-SCNC: 22 MMOL/L (ref 20–29)
CREAT SERPL-MCNC: 0.91 MG/DL (ref 0.57–1)
EGFRCR SERPLBLD CKD-EPI 2021: 77 ML/MIN/1.73
EOSINOPHIL # BLD AUTO: 0.2 X10E3/UL (ref 0–0.4)
EOSINOPHIL NFR BLD AUTO: 2 %
ERYTHROCYTE [DISTWIDTH] IN BLOOD BY AUTOMATED COUNT: 12.6 % (ref 11.7–15.4)
GLOBULIN SER CALC-MCNC: 2.8 G/DL (ref 1.5–4.5)
GLUCOSE SERPL-MCNC: 112 MG/DL (ref 65–99)
HCT VFR BLD AUTO: 41.5 % (ref 34–46.6)
HDLC SERPL-MCNC: 43 MG/DL
HGB BLD-MCNC: 13.8 G/DL (ref 11.1–15.9)
IMM GRANULOCYTES # BLD AUTO: 0.1 X10E3/UL (ref 0–0.1)
IMM GRANULOCYTES NFR BLD AUTO: 1 %
LDLC SERPL CALC-MCNC: 152 MG/DL (ref 0–99)
LYMPHOCYTES # BLD AUTO: 3.9 X10E3/UL (ref 0.7–3.1)
LYMPHOCYTES NFR BLD AUTO: 35 %
MCH RBC QN AUTO: 28.5 PG (ref 26.6–33)
MCHC RBC AUTO-ENTMCNC: 33.3 G/DL (ref 31.5–35.7)
MCV RBC AUTO: 86 FL (ref 79–97)
MONOCYTES # BLD AUTO: 0.9 X10E3/UL (ref 0.1–0.9)
MONOCYTES NFR BLD AUTO: 8 %
NEUTROPHILS # BLD AUTO: 6.1 X10E3/UL (ref 1.4–7)
NEUTROPHILS NFR BLD AUTO: 53 %
PLATELET # BLD AUTO: 285 X10E3/UL (ref 150–450)
POTASSIUM SERPL-SCNC: 4.2 MMOL/L (ref 3.5–5.2)
PROT SERPL-MCNC: 7.6 G/DL (ref 6–8.5)
RBC # BLD AUTO: 4.84 X10E6/UL (ref 3.77–5.28)
SODIUM SERPL-SCNC: 137 MMOL/L (ref 134–144)
T4 FREE SERPL-MCNC: 1.49 NG/DL (ref 0.82–1.77)
TRIGL SERPL-MCNC: 136 MG/DL (ref 0–149)
TSH SERPL DL<=0.005 MIU/L-ACNC: 2.5 UIU/ML (ref 0.45–4.5)
VLDLC SERPL CALC-MCNC: 25 MG/DL (ref 5–40)
WBC # BLD AUTO: 11.2 X10E3/UL (ref 3.4–10.8)

## 2022-06-21 RX ORDER — LEVOTHYROXINE SODIUM 0.1 MG/1
100 TABLET ORAL DAILY
Qty: 30 TABLET | Refills: 0 | Status: SHIPPED | OUTPATIENT
Start: 2022-06-21 | End: 2022-08-10

## 2022-06-21 RX ORDER — OMEPRAZOLE 40 MG/1
40 CAPSULE, DELAYED RELEASE ORAL DAILY
Qty: 90 CAPSULE | Refills: 1 | Status: SHIPPED | OUTPATIENT
Start: 2022-06-21 | End: 2022-12-14 | Stop reason: SDUPTHER

## 2022-06-21 RX ORDER — LORATADINE AND PSEUDOEPHEDRINE SULFATE 10; 240 MG/1; MG/1
1 TABLET, EXTENDED RELEASE ORAL DAILY
Qty: 90 TABLET | Refills: 1 | Status: SHIPPED | OUTPATIENT
Start: 2022-06-21

## 2022-06-27 ENCOUNTER — TELEPHONE (OUTPATIENT)
Dept: FAMILY MEDICINE CLINIC | Facility: CLINIC | Age: 47
End: 2022-06-27

## 2022-06-27 RX ORDER — HYDROXYZINE PAMOATE 25 MG/1
25 CAPSULE ORAL DAILY PRN
Qty: 30 CAPSULE | Refills: 1 | Status: SHIPPED | OUTPATIENT
Start: 2022-06-27 | End: 2022-12-14

## 2022-06-27 NOTE — TELEPHONE ENCOUNTER
Please let her know that for some reason the message that I sent her didn't save. I will send in vistaril and she can try this at bedtime to help with the insomnia. Thanks.

## 2022-08-10 RX ORDER — LEVOTHYROXINE SODIUM 100 UG/1
TABLET ORAL
Qty: 90 TABLET | Refills: 1 | Status: SHIPPED | OUTPATIENT
Start: 2022-08-10 | End: 2023-02-01 | Stop reason: SDUPTHER

## 2022-08-10 NOTE — TELEPHONE ENCOUNTER
Caller: Aleksandra Raymond    Relationship: Self    Best call back number: [phone]    Requested Prescriptions:   Requested Prescriptions     Pending Prescriptions Disp Refills   • Euthyrox 100 MCG tablet [Pharmacy Med Name: Euthyrox 100 MCG Oral Tablet] 30 tablet 0     Sig: Take 1 tablet by mouth once daily        Pharmacy where request should be sent: [facility] [address] [place] [address] [phone] [facility] [phone] FX     Additional details provided by patient: OUT OF MEDICATION. PATIENT STATES PHARMACY REQUESTED A REFILL ON Monday BUT HAS NOT RECEIVED A RESPONSE BACK.     Does the patient have less than a 3 day supply:  [x] Yes  [] No    Laureen Johnston Rep   08/10/22 09:55 EDT             
has upcoming appointment, sent in medication refill.  tsh was normal last lab draw  
normal...

## 2022-12-14 ENCOUNTER — OFFICE VISIT (OUTPATIENT)
Dept: FAMILY MEDICINE CLINIC | Facility: CLINIC | Age: 47
End: 2022-12-14

## 2022-12-14 VITALS
SYSTOLIC BLOOD PRESSURE: 126 MMHG | BODY MASS INDEX: 24.96 KG/M2 | DIASTOLIC BLOOD PRESSURE: 78 MMHG | HEIGHT: 67 IN | WEIGHT: 159 LBS | OXYGEN SATURATION: 98 % | HEART RATE: 105 BPM

## 2022-12-14 DIAGNOSIS — K21.9 GASTROESOPHAGEAL REFLUX DISEASE, UNSPECIFIED WHETHER ESOPHAGITIS PRESENT: ICD-10-CM

## 2022-12-14 DIAGNOSIS — E78.2 MIXED HYPERLIPIDEMIA: ICD-10-CM

## 2022-12-14 DIAGNOSIS — R73.01 ELEVATED FASTING GLUCOSE: ICD-10-CM

## 2022-12-14 DIAGNOSIS — E03.9 ACQUIRED HYPOTHYROIDISM: Primary | ICD-10-CM

## 2022-12-14 PROCEDURE — 99214 OFFICE O/P EST MOD 30 MIN: CPT | Performed by: STUDENT IN AN ORGANIZED HEALTH CARE EDUCATION/TRAINING PROGRAM

## 2022-12-14 PROCEDURE — 36415 COLL VENOUS BLD VENIPUNCTURE: CPT | Performed by: STUDENT IN AN ORGANIZED HEALTH CARE EDUCATION/TRAINING PROGRAM

## 2022-12-14 RX ORDER — OMEPRAZOLE 40 MG/1
40 CAPSULE, DELAYED RELEASE ORAL DAILY
Qty: 90 CAPSULE | Refills: 1 | Status: SHIPPED | OUTPATIENT
Start: 2022-12-14

## 2022-12-14 NOTE — PROGRESS NOTES
"Chief Complaint  No chief complaint on file.    Subjective          Aleksandra Raymond presents to [facility]  History of Present Illness    Patient states that she has been doing well at this time.     She states that she has not been watching her diet over the past several  Months. She states that she does not drink regular soda, and has been drinking diet and trying to avoid processed foods and refined glucose.     She is tolerating her thyroid medications at this time. She has not had any palpitations, chest pain, SOA or LE edema that does not improve.     GERD is well controlled with current medications. She is needing refills at this time.     Objective   Vital Signs:   /78 (BP Location: Left arm, Patient Position: Sitting, Cuff Size: Adult)   Pulse 105   Ht 170.2 cm (67\")   Wt 72.1 kg (159 lb)   SpO2 98%   BMI 24.90 kg/m²     Body mass index is 24.9 kg/m².    Review of Systems    Past History:  Medical History: has a past medical history of Acid reflux, Anxiety syndrome, Dysmenorrhea, History of Chiari malformation, Hypothyroid, Migraine, and Tonsillitis.   Surgical History: has a past surgical history that includes Intrauterine device insertion (02/27/2019) and Tonsillectomy.   Family History: family history includes Breast cancer (age of onset: 47) in her mother; Breast cancer (age of onset: 52) in her maternal aunt; Diabetes in her brother; Hypertension in her mother; Pancreatic cancer in her father; Prostate cancer in her father.   Social History: reports that she has never smoked. She has never used smokeless tobacco. She reports current alcohol use. She reports that she does not use drugs.      Current Outpatient Medications:   •  omeprazole (priLOSEC) 40 MG capsule, Take 1 capsule by mouth Daily., Disp: 90 capsule, Rfl: 1  •  Drospirenone (Slynd) 4 MG tablet, Take 1 tablet by mouth Daily., Disp: 84 tablet, Rfl: 4  •  Euthyrox 100 MCG tablet, Take 1 tablet by " mouth once daily, Disp: 90 tablet, Rfl: 1  •  loratadine-pseudoephedrine (Claritin-D 24 Hour)  MG per 24 hr tablet, Take 1 tablet by mouth Daily., Disp: 90 tablet, Rfl: 1    Allergies: Patient has no known allergies.    Physical Exam  Constitutional:       General: She is not in acute distress.     Appearance: She is not ill-appearing or toxic-appearing.   HENT:      Head: Normocephalic and atraumatic.   Cardiovascular:      Rate and Rhythm: Normal rate and regular rhythm.      Heart sounds: No murmur heard.  Pulmonary:      Effort: Pulmonary effort is normal. No respiratory distress.   Neurological:      General: No focal deficit present.      Mental Status: She is alert and oriented to person, place, and time.   Psychiatric:         Mood and Affect: Mood normal.         Thought Content: Thought content normal.          Result Review :                   Assessment and Plan    Diagnoses and all orders for this visit:    1. Acquired hypothyroidism (Primary)  -     TSH; Future  -     T4, Free; Future  -     TSH  -     T4, Free    2. Mixed hyperlipidemia  -     Comprehensive Metabolic Panel; Future  -     CBC & Differential; Future  -     Lipid Panel; Future  -     Comprehensive Metabolic Panel  -     CBC & Differential  -     Lipid Panel    3. Elevated fasting glucose  -     Comprehensive Metabolic Panel; Future  -     CBC & Differential; Future  -     Hemoglobin A1c; Future  -     Comprehensive Metabolic Panel  -     CBC & Differential  -     Hemoglobin A1c    4. Gastroesophageal reflux disease, unspecified whether esophagitis present    Other orders  -     omeprazole (priLOSEC) 40 MG capsule; Take 1 capsule by mouth Daily.  Dispense: 90 capsule; Refill: 1    Blood work ordered and will contact with results when available. Discussed continued monitoring of fried and fatty foods as well as refined glucose to help with cholesterol and fasting glucose.     Omeprazole refilled as she is doing well with this  medication.     Continue current dose of levothyroxine unless blood work is abnormal and then we will adjust this.     Follow up in 6  Months for physical or sooner with any new or worsening symptoms.       Follow Up   No follow-ups on file.  Patient was given instructions and counseling regarding her condition or for health maintenance advice. Please see specific information pulled into the AVS if appropriate.     Harriett Escamilla, DO

## 2022-12-15 LAB
ALBUMIN SERPL-MCNC: 4.8 G/DL (ref 3.8–4.8)
ALBUMIN/GLOB SERPL: 1.6 {RATIO} (ref 1.2–2.2)
ALP SERPL-CCNC: 88 IU/L (ref 44–121)
ALT SERPL-CCNC: 14 IU/L (ref 0–32)
AST SERPL-CCNC: 18 IU/L (ref 0–40)
BASOPHILS # BLD AUTO: 0.1 X10E3/UL (ref 0–0.2)
BASOPHILS NFR BLD AUTO: 1 %
BILIRUB SERPL-MCNC: <0.2 MG/DL (ref 0–1.2)
BUN SERPL-MCNC: 21 MG/DL (ref 6–24)
BUN/CREAT SERPL: 19 (ref 9–23)
CALCIUM SERPL-MCNC: 9.8 MG/DL (ref 8.7–10.2)
CHLORIDE SERPL-SCNC: 102 MMOL/L (ref 96–106)
CHOLEST SERPL-MCNC: 227 MG/DL (ref 100–199)
CO2 SERPL-SCNC: 21 MMOL/L (ref 20–29)
CREAT SERPL-MCNC: 1.09 MG/DL (ref 0.57–1)
EGFRCR SERPLBLD CKD-EPI 2021: 62 ML/MIN/1.73
EOSINOPHIL # BLD AUTO: 0.2 X10E3/UL (ref 0–0.4)
EOSINOPHIL NFR BLD AUTO: 2 %
ERYTHROCYTE [DISTWIDTH] IN BLOOD BY AUTOMATED COUNT: 13 % (ref 11.7–15.4)
GLOBULIN SER CALC-MCNC: 3 G/DL (ref 1.5–4.5)
GLUCOSE SERPL-MCNC: 177 MG/DL (ref 70–99)
HBA1C MFR BLD: 7.5 % (ref 4.8–5.6)
HCT VFR BLD AUTO: 42.7 % (ref 34–46.6)
HDLC SERPL-MCNC: 51 MG/DL
HGB BLD-MCNC: 13.9 G/DL (ref 11.1–15.9)
IMM GRANULOCYTES # BLD AUTO: 0.1 X10E3/UL (ref 0–0.1)
IMM GRANULOCYTES NFR BLD AUTO: 1 %
LDLC SERPL CALC-MCNC: 149 MG/DL (ref 0–99)
LYMPHOCYTES # BLD AUTO: 3.2 X10E3/UL (ref 0.7–3.1)
LYMPHOCYTES NFR BLD AUTO: 33 %
MCH RBC QN AUTO: 27.3 PG (ref 26.6–33)
MCHC RBC AUTO-ENTMCNC: 32.6 G/DL (ref 31.5–35.7)
MCV RBC AUTO: 84 FL (ref 79–97)
MONOCYTES # BLD AUTO: 0.8 X10E3/UL (ref 0.1–0.9)
MONOCYTES NFR BLD AUTO: 8 %
NEUTROPHILS # BLD AUTO: 5.4 X10E3/UL (ref 1.4–7)
NEUTROPHILS NFR BLD AUTO: 55 %
PLATELET # BLD AUTO: 281 X10E3/UL (ref 150–450)
POTASSIUM SERPL-SCNC: 4.7 MMOL/L (ref 3.5–5.2)
PROT SERPL-MCNC: 7.8 G/DL (ref 6–8.5)
RBC # BLD AUTO: 5.1 X10E6/UL (ref 3.77–5.28)
SODIUM SERPL-SCNC: 139 MMOL/L (ref 134–144)
T4 FREE SERPL-MCNC: 1.52 NG/DL (ref 0.82–1.77)
TRIGL SERPL-MCNC: 148 MG/DL (ref 0–149)
TSH SERPL DL<=0.005 MIU/L-ACNC: 2.89 UIU/ML (ref 0.45–4.5)
VLDLC SERPL CALC-MCNC: 27 MG/DL (ref 5–40)
WBC # BLD AUTO: 9.7 X10E3/UL (ref 3.4–10.8)

## 2023-02-01 RX ORDER — LEVOTHYROXINE SODIUM 0.1 MG/1
100 TABLET ORAL DAILY
Qty: 90 TABLET | Refills: 0 | Status: SHIPPED | OUTPATIENT
Start: 2023-02-01

## 2023-03-07 ENCOUNTER — OFFICE VISIT (OUTPATIENT)
Dept: OBSTETRICS AND GYNECOLOGY | Facility: CLINIC | Age: 48
End: 2023-03-07
Payer: COMMERCIAL

## 2023-03-07 VITALS
HEIGHT: 67 IN | WEIGHT: 156 LBS | DIASTOLIC BLOOD PRESSURE: 64 MMHG | BODY MASS INDEX: 24.48 KG/M2 | SYSTOLIC BLOOD PRESSURE: 122 MMHG

## 2023-03-07 DIAGNOSIS — Z01.419 WOMEN'S ANNUAL ROUTINE GYNECOLOGICAL EXAMINATION: Primary | ICD-10-CM

## 2023-03-07 DIAGNOSIS — Z80.3 FAMILY HISTORY OF BREAST CANCER: ICD-10-CM

## 2023-03-07 DIAGNOSIS — Z12.31 BREAST CANCER SCREENING BY MAMMOGRAM: ICD-10-CM

## 2023-03-07 DIAGNOSIS — N92.6 IRREGULAR PERIODS: ICD-10-CM

## 2023-03-07 DIAGNOSIS — Z30.011 ENCOUNTER FOR INITIAL PRESCRIPTION OF CONTRACEPTIVE PILLS: ICD-10-CM

## 2023-03-07 PROCEDURE — 99396 PREV VISIT EST AGE 40-64: CPT | Performed by: OBSTETRICS & GYNECOLOGY

## 2023-03-07 RX ORDER — DROSPIRENONE 4 MG/1
1 TABLET, FILM COATED ORAL DAILY
Qty: 84 TABLET | Refills: 4 | Status: SHIPPED | OUTPATIENT
Start: 2023-03-07

## 2023-03-07 NOTE — PROGRESS NOTES
Gynecologic Annual Exam Note          GYN Annual Exam     Gynecologic Exam        Subjective     HPI  Aleksandra Raymond is a 47 y.o. female, , who presents for annual well woman exam as a established patient . No LMP recorded (lmp unknown).  Patient reports problems with: bloating and night sweats.  Pt states she has not had a period since 2022. She reports dysmenorrhea is none. Partner Status: Marital Status: . She is is sexually active. She has not had new partners.. STD testing recommendations have been explained to the patient and she does not desire STD testing. There were no changes to her medical or surgical history since her last visit..       Additional OB/GYN History   Current contraception: contraceptive methods: Oral progesterone-only contraceptive  Desires to: discuss about stopping contraception    Last Pap : 3/1/21. Result: negative. HPV: negative.   Last Completed Pap Smear          PAP SMEAR (Every 3 Years) Next due on 3/4/2024    2021  Pap IG, HPV-hr    2020  Done - negative              History of abnormal Pap smear: yes - years ago, per pt she had ablation  Family history of uterine, colon, breast, or ovarian cancer: yes - breast- mother and maternal aunt  Performs monthly Self-Breast Exam: yes  Last mammogram: 22. Done at [facility].    Last Completed Mammogram     This patient has no relevant Health Maintenance data.          History of abnormal mammogram: yes - dense breast tissue    Colonoscopy: has had a colonoscopy but unknown date.  Exercises Regularly: yes  Feelings of Anxiety or Depression: no  Tobacco Usage?: No       Current Outpatient Medications:   •  levothyroxine (Euthyrox) 100 MCG tablet, Take 1 tablet by mouth Daily., Disp: 90 tablet, Rfl: 0  •  loratadine-pseudoephedrine (Claritin-D 24 Hour)  MG per 24 hr tablet, Take 1 tablet by mouth Daily., Disp: 90 tablet, Rfl: 1  •  omeprazole (priLOSEC) 40 MG capsule, Take 1 capsule by mouth  Daily., Disp: 90 capsule, Rfl: 1  •  Drospirenone (Slynd) 4 MG tablet, Take 1 tablet by mouth Daily., Disp: 84 tablet, Rfl: 4     Patient is requesting no refills today.    OB History        1    Para   1    Term   1            AB        Living           SAB        IAB        Ectopic        Molar        Multiple        Live Births                    Past Medical History:   Diagnosis Date   • Acid reflux    • Anxiety syndrome     DRUG THERAPY   • Dysmenorrhea    • History of Chiari malformation     NEURO REFERRAL   • Hypothyroid    • Migraine    • Tonsillitis         Past Surgical History:   Procedure Laterality Date   • INTRAUTERINE DEVICE INSERTION  2019   • TONSILLECTOMY         Health Maintenance   Topic Date Due   • HEPATITIS C SCREENING  Never done   • COVID-19 Vaccine (5 - Booster) 2022   • INFLUENZA VACCINE  Never done   • Annual Gynecologic Pelvic and Breast Exam  2023   • TDAP/TD VACCINES (1 - Tdap) 2023 (Originally 3/21/1992)   • ANNUAL PHYSICAL  2023   • LIPID PANEL  2023   • PAP SMEAR  2024   • COLORECTAL CANCER SCREENING  2027   • Pneumococcal Vaccine 0-64  Aged Out       The additional following portions of the patient's history were reviewed and updated as appropriate: allergies, current medications, past family history, past medical history, past social history, past surgical history and problem list.    Review of Systems   Constitutional: Negative.    HENT: Negative.    Eyes: Negative.    Respiratory: Negative.    Cardiovascular: Negative.    Gastrointestinal: Negative.    Endocrine: Negative.    Genitourinary: Negative.    Musculoskeletal: Negative.    Skin: Negative.    Allergic/Immunologic: Negative.    Neurological: Negative.    Hematological: Negative.    Psychiatric/Behavioral: Negative.          I have reviewed and agree with the HPI, ROS, and historical information as entered above. Elda Byrnes MD      Objective   BP  "122/64   Ht 170.2 cm (67\")   Wt 70.8 kg (156 lb)   LMP  (LMP Unknown)   BMI 24.43 kg/m²     Physical Exam  Vitals and nursing note reviewed. Exam conducted with a chaperone present.   Constitutional:       Appearance: She is well-developed.   HENT:      Head: Normocephalic and atraumatic.   Neck:      Thyroid: No thyroid mass or thyromegaly.   Cardiovascular:      Rate and Rhythm: Normal rate and regular rhythm.      Heart sounds: No murmur heard.  Pulmonary:      Effort: Pulmonary effort is normal. No retractions.      Breath sounds: Normal breath sounds. No wheezing, rhonchi or rales.   Chest:      Chest wall: No mass or tenderness.   Breasts:     Right: Normal. No mass, nipple discharge, skin change or tenderness.      Left: Normal. No mass, nipple discharge, skin change or tenderness.   Abdominal:      General: Bowel sounds are normal.      Palpations: Abdomen is soft. Abdomen is not rigid. There is no mass.      Tenderness: There is no abdominal tenderness. There is no guarding.      Hernia: No hernia is present. There is no hernia in the left inguinal area or right inguinal area.   Genitourinary:     General: Normal vulva.      Exam position: Lithotomy position.      Pubic Area: No rash.       Labia:         Right: No rash, tenderness or lesion.         Left: No rash, tenderness or lesion.       Urethra: No urethral pain or urethral swelling.      Vagina: Normal. No vaginal discharge or lesions.      Cervix: No cervical motion tenderness, discharge, lesion or cervical bleeding.      Uterus: Normal. Not enlarged, not fixed and not tender.       Adnexa:         Right: No mass, tenderness or fullness.          Left: No mass, tenderness or fullness.        Rectum: No external hemorrhoid.   Musculoskeletal:      Cervical back: Normal range of motion. No muscular tenderness.   Neurological:      Mental Status: She is alert and oriented to person, place, and time.   Psychiatric:         Behavior: Behavior normal. "            Assessment and Plan    Problem List Items Addressed This Visit        Family History    Family history of breast cancer    Overview     Diagnosed in patient's mother at age 48 and then patient's maternal aunt and age 52  Patient has mammograms at [facility]         Relevant Orders    Mammo Screening Digital Tomosynthesis Bilateral With CAD       Genitourinary and Reproductive     Women's annual routine gynecological examination - Primary    Overview     3/1/2021-Pap smear completed         Irregular periods    Overview     Patient reports she had oligomenorrhea as a teenager up until about age 25.  She required Clomid to get pregnant.  Following that she had a D&C for a miscarriage at age 27.  From that time on she has had regular cycles until this past year.  She does have a history of thyroid dysfunction for which she is on medicine however it has not been tested since June.  We will get labs today and cycle on progestin only pills secondary to history of migraine with aura.In 2020 patient developed irregular periods ranging from 3 to 8 weeks apart.         Relevant Medications    Drospirenone (Slynd) 4 MG tablet   Other Visit Diagnoses     Breast cancer screening by mammogram        Relevant Orders    Mammo Screening Digital Tomosynthesis Bilateral With CAD    Encounter for initial prescription of contraceptive pills        Relevant Medications    Drospirenone (Slynd) 4 MG tablet          1. GYN annual well woman exam.   2. Pap guidelines reviewed.  3. Reviewed monthly self breast exams.  Instructed to call with lumps, pain, or breast discharge.    4. Ordered Mammogram today  5. Recommended use of Vitamin D replacement and getting adequate calcium in her diet. (1500mg)  6. Reviewed BMI and weight loss as preventative health measures.   7. Reviewed exercise as a preventative health measures.   8. Reccommended Flu Vaccine in Fall of each year.  9. Symptoms of menopausal transition reviewed  with patient.   10. RTC in 1 year or PRN with problems.  11. Other: Discussed birth control options.  Plan to continue progestin only pill.  Patient to follow-up with gastroenterologist as she is due for her colonoscopy this next year.  12. Return in about 1 year (around 3/7/2024) for Annual physical.     Elda Byrnes MD  03/07/2023

## 2023-04-04 ENCOUNTER — TRANSCRIBE ORDERS (OUTPATIENT)
Dept: ADMINISTRATIVE | Facility: HOSPITAL | Age: 48
End: 2023-04-04
Payer: COMMERCIAL

## 2023-04-04 DIAGNOSIS — Z12.31 VISIT FOR SCREENING MAMMOGRAM: Primary | ICD-10-CM

## 2023-04-30 ENCOUNTER — PATIENT MESSAGE (OUTPATIENT)
Dept: FAMILY MEDICINE CLINIC | Facility: CLINIC | Age: 48
End: 2023-04-30
Payer: COMMERCIAL

## 2023-05-01 RX ORDER — DIAPER,BRIEF,INFANT-TODD,DISP
1 EACH MISCELLANEOUS 2 TIMES DAILY
Qty: 56 G | Refills: 0 | Status: SHIPPED | OUTPATIENT
Start: 2023-05-01 | End: 2023-05-01

## 2023-05-09 RX ORDER — LEVOTHYROXINE SODIUM 0.1 MG/1
100 TABLET ORAL DAILY
Qty: 90 TABLET | Refills: 0 | Status: SHIPPED | OUTPATIENT
Start: 2023-05-09

## 2023-08-28 ENCOUNTER — TRANSCRIBE ORDERS (OUTPATIENT)
Dept: CARDIOLOGY | Facility: HOSPITAL | Age: 48
End: 2023-08-28
Payer: COMMERCIAL

## 2023-08-28 ENCOUNTER — TRANSCRIBE ORDERS (OUTPATIENT)
Dept: LAB | Facility: HOSPITAL | Age: 48
End: 2023-08-28
Payer: COMMERCIAL

## 2023-08-28 ENCOUNTER — LAB (OUTPATIENT)
Dept: LAB | Facility: HOSPITAL | Age: 48
End: 2023-08-28
Payer: COMMERCIAL

## 2023-08-28 ENCOUNTER — HOSPITAL ENCOUNTER (OUTPATIENT)
Dept: CARDIOLOGY | Facility: HOSPITAL | Age: 48
Discharge: HOME | End: 2023-08-28
Payer: COMMERCIAL

## 2023-08-28 DIAGNOSIS — I10 HYPERTENSION, UNSPECIFIED TYPE: Primary | ICD-10-CM

## 2023-08-28 DIAGNOSIS — I10 HYPERTENSION, UNSPECIFIED TYPE: ICD-10-CM

## 2023-08-28 LAB
ANION GAP SERPL CALCULATED.3IONS-SCNC: 12 MMOL/L (ref 5–15)
BUN SERPL-MCNC: 20 MG/DL (ref 6–20)
BUN/CREAT SERPL: 22.7 (ref 7–25)
CALCIUM SPEC-SCNC: 9.5 MG/DL (ref 8.6–10.5)
CHLORIDE SERPL-SCNC: 103 MMOL/L (ref 98–107)
CO2 SERPL-SCNC: 26 MMOL/L (ref 22–29)
CREAT SERPL-MCNC: 0.88 MG/DL (ref 0.57–1)
DEPRECATED RDW RBC AUTO: 42 FL (ref 37–54)
EGFRCR SERPLBLD CKD-EPI 2021: 80.2 ML/MIN/1.73
ERYTHROCYTE [DISTWIDTH] IN BLOOD BY AUTOMATED COUNT: 12.8 % (ref 12.3–15.4)
GLUCOSE SERPL-MCNC: 136 MG/DL (ref 65–99)
HCT VFR BLD AUTO: 44 % (ref 34–46.6)
HGB BLD-MCNC: 14.3 G/DL (ref 12–15.9)
MCH RBC QN AUTO: 28.8 PG (ref 26.6–33)
MCHC RBC AUTO-ENTMCNC: 32.5 G/DL (ref 31.5–35.7)
MCV RBC AUTO: 88.7 FL (ref 79–97)
PLATELET # BLD AUTO: 251 10*3/MM3 (ref 140–450)
PMV BLD AUTO: 11.2 FL (ref 6–12)
POTASSIUM SERPL-SCNC: 4.4 MMOL/L (ref 3.5–5.2)
QT INTERVAL: 406 MS
QTC INTERVAL: 488 MS
RBC # BLD AUTO: 4.96 10*6/MM3 (ref 3.77–5.28)
SODIUM SERPL-SCNC: 141 MMOL/L (ref 136–145)
WBC NRBC COR # BLD: 9.06 10*3/MM3 (ref 3.4–10.8)

## 2023-08-28 PROCEDURE — 85027 COMPLETE CBC AUTOMATED: CPT | Performed by: COLON & RECTAL SURGERY

## 2023-08-28 PROCEDURE — 93005 ELECTROCARDIOGRAM TRACING: CPT | Performed by: COLON & RECTAL SURGERY

## 2023-08-28 PROCEDURE — 36415 COLL VENOUS BLD VENIPUNCTURE: CPT

## 2023-08-28 PROCEDURE — 80048 BASIC METABOLIC PNL TOTAL CA: CPT

## 2023-09-07 ENCOUNTER — LAB REQUISITION (OUTPATIENT)
Dept: LAB | Facility: HOSPITAL | Age: 48
End: 2023-09-07
Payer: COMMERCIAL

## 2023-09-07 DIAGNOSIS — K64.5 PERIANAL VENOUS THROMBOSIS: ICD-10-CM

## 2023-09-07 PROCEDURE — 88342 IMHCHEM/IMCYTCHM 1ST ANTB: CPT

## 2023-09-07 PROCEDURE — 88341 IMHCHEM/IMCYTCHM EA ADD ANTB: CPT

## 2023-09-07 PROCEDURE — 88305 TISSUE EXAM BY PATHOLOGIST: CPT

## 2023-09-13 LAB — REF LAB TEST METHOD: NORMAL

## 2023-11-17 RX ORDER — LORATADINE PSEUDOEPHEDRINE SULFATE 10; 240 MG/1; MG/1
1 TABLET, EXTENDED RELEASE ORAL DAILY
Qty: 90 TABLET | Refills: 0 | Status: SHIPPED | OUTPATIENT
Start: 2023-11-17

## 2024-01-22 ENCOUNTER — HOSPITAL ENCOUNTER (OUTPATIENT)
Dept: MAMMOGRAPHY | Facility: HOSPITAL | Age: 49
Discharge: HOME | End: 2024-01-22
Admitting: OBSTETRICS & GYNECOLOGY
Payer: COMMERCIAL

## 2024-01-22 DIAGNOSIS — Z12.31 BREAST CANCER SCREENING BY MAMMOGRAM: ICD-10-CM

## 2024-01-22 DIAGNOSIS — Z80.3 FAMILY HISTORY OF BREAST CANCER: ICD-10-CM

## 2024-01-22 PROCEDURE — 77067 SCR MAMMO BI INCL CAD: CPT

## 2024-01-22 PROCEDURE — 77063 BREAST TOMOSYNTHESIS BI: CPT

## 2024-01-23 PROCEDURE — 77067 SCR MAMMO BI INCL CAD: CPT | Performed by: RADIOLOGY

## 2024-01-23 PROCEDURE — 77063 BREAST TOMOSYNTHESIS BI: CPT | Performed by: RADIOLOGY

## 2024-03-11 ENCOUNTER — OFFICE VISIT (OUTPATIENT)
Dept: OBSTETRICS AND GYNECOLOGY | Facility: CLINIC | Age: 49
End: 2024-03-11
Payer: COMMERCIAL

## 2024-03-11 VITALS
DIASTOLIC BLOOD PRESSURE: 78 MMHG | SYSTOLIC BLOOD PRESSURE: 112 MMHG | HEIGHT: 67 IN | BODY MASS INDEX: 24.27 KG/M2 | WEIGHT: 154.6 LBS

## 2024-03-11 DIAGNOSIS — Z01.419 WOMEN'S ANNUAL ROUTINE GYNECOLOGICAL EXAMINATION: Primary | ICD-10-CM

## 2024-03-11 DIAGNOSIS — Z12.31 BREAST CANCER SCREENING BY MAMMOGRAM: ICD-10-CM

## 2024-03-11 DIAGNOSIS — Z80.3 FAMILY HISTORY OF BREAST CANCER: ICD-10-CM

## 2024-03-11 PROCEDURE — 99459 PELVIC EXAMINATION: CPT | Performed by: OBSTETRICS & GYNECOLOGY

## 2024-03-11 PROCEDURE — 99396 PREV VISIT EST AGE 40-64: CPT | Performed by: OBSTETRICS & GYNECOLOGY

## 2024-03-11 NOTE — PROGRESS NOTES
Gynecologic Annual Exam Note        GYN Annual Exam     CC - Here for annual exam.        HPI  Aleksandra Raymond is a 48 y.o. female, , who presents for annual well woman exam as a established patient.  She is postmenopausal. Reports her last period was in 2022. Denies vaginal bleeding  Since her last visit the patient underwent surgery for hemorrhoidectomy. 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 She has follow ups every 3 months with [facility] to monitor for changes. Marital Status: .  She is sexually active. She has not had new partners.. STD testing recommendations have been explained to the patient and she declines STD testing.    States she discontinued her POPs in 2023 after her last annual. Reports she had not had a period since  and wanted to see if she was menopausal.     The patient would like to discuss the following complaints today: pt reports hot flashes that she takes black cohash for. Also reports increased appetite, weight gain, brain fog,     Additional OB/GYN History   On HRT? No    Last Pap : 3/4/21. Results: negative. HPV: negative.   Last Completed Pap Smear       This patient has no relevant Health Maintenance data.          History of abnormal Pap smear: yes - many years ago  Family history of uterine, colon, breast, or ovarian cancer: yes - mother, maternal aunt- breast    Performs monthly Self-Breast Exam: no  Last mammogram: 24. Done at .    Last Completed Mammogram            MAMMOGRAM (Every 2 Years) Next due on 2024  Mammo Screening Digital Tomosynthesis Bilateral With CAD    2022  Mammo Screening Digital Tomosynthesis Bilateral With CAD    2021  Mammo Diagnostic Digital Tomosynthesis Bilateral With CAD    2021  Mammo Screening Digital Tomosynthesis Bilateral With CAD    2019  Mammo Screening Digital Tomosynthesis Bilateral With CAD    Only the first 5 history entries have been  loaded, but more history exists.                  Last colonoscopy: has had a colonoscopy 7 years ago.    Last Completed Colonoscopy            COLORECTAL CANCER SCREENING (COLONOSCOPY - Every 10 Years) Next due on 2027  Colonoscopy                    She has never had a bone density scan  Exercises Regularly: yes  Feelings of Anxiety or Depression: no      Tobacco Usage?: No       Current Outpatient Medications:     levothyroxine (Euthyrox) 100 MCG tablet, Take 1 tablet by mouth Daily., Disp: 90 tablet, Rfl: 3    loratadine-pseudoephedrine (Claritin-D 24 Hour)  MG per 24 hr tablet, Take 1 tablet by mouth Daily., Disp: 90 tablet, Rfl: 0    omeprazole (priLOSEC) 40 MG capsule, Take 1 capsule by mouth Daily., Disp: 90 capsule, Rfl: 3    Patient denies the need for medication refills today.    OB History          1    Para   1    Term   1            AB        Living             SAB        IAB        Ectopic        Molar        Multiple        Live Births   1                Past Medical History:   Diagnosis Date    Abnormal ECG     Abnormal Pap smear of cervix     Don't remember    Acid reflux     Anxiety syndrome     DRUG THERAPY    Diabetes mellitus     Dysmenorrhea     Female infertility     History of Chiari malformation     NEURO REFERRAL    HPV (human papilloma virus) infection     In your records, I don't remember.    Hypothyroid     Migraine     PONV (postoperative nausea and vomiting)     Tonsillitis     Varicella         Past Surgical History:   Procedure Laterality Date    D & C WITH SUCTION      HEMORRHOIDECTOMY      INTRAUTERINE DEVICE INSERTION  2019    TONSILLECTOMY      WISDOM TOOTH EXTRACTION         Health Maintenance   Topic Date Due    ZOSTER VACCINE (1 of 2) Never done    INFLUENZA VACCINE  Never done    COVID-19 Vaccine ( season) 2023    PAP SMEAR  2024    Annual Gynecologic Pelvic and Breast Exam  2024    ANNUAL PHYSICAL  " 06/22/2024    LIPID PANEL  06/22/2024    MAMMOGRAM  01/22/2026    COLORECTAL CANCER SCREENING  12/29/2027    TDAP/TD VACCINES (2 - Tdap) 06/22/2033    HEPATITIS C SCREENING  Completed    Pneumococcal Vaccine 0-64  Aged Out       The additional following portions of the patient's history were reviewed and updated as appropriate: allergies, current medications, past family history, past medical history, past social history, past surgical history, and problem list.    Review of Systems   Constitutional:  Positive for appetite change and unexpected weight gain.   HENT: Negative.     Eyes: Negative.    Respiratory: Negative.     Cardiovascular: Negative.    Gastrointestinal: Negative.    Endocrine: Positive for heat intolerance.   Genitourinary: Negative.    Musculoskeletal: Negative.    Skin: Negative.    Allergic/Immunologic: Negative.    Neurological: Negative.    Hematological: Negative.    Psychiatric/Behavioral: Negative.         I have reviewed and agree with the HPI, ROS, and historical information as entered above. Elda Byrnes MD      Objective   /78   Ht 170.2 cm (67\")   Wt 70.1 kg (154 lb 9.6 oz)   LMP 03/01/2022   BMI 24.21 kg/m²     Physical Exam  Vitals and nursing note reviewed. Exam conducted with a chaperone present.   Constitutional:       Appearance: She is well-developed.   HENT:      Head: Normocephalic and atraumatic.   Neck:      Thyroid: No thyroid mass or thyromegaly.   Cardiovascular:      Rate and Rhythm: Normal rate and regular rhythm.      Heart sounds: No murmur heard.  Pulmonary:      Effort: Pulmonary effort is normal. No retractions.      Breath sounds: Normal breath sounds. No wheezing, rhonchi or rales.   Chest:      Chest wall: No mass or tenderness.   Breasts:     Right: Normal. No mass, nipple discharge, skin change or tenderness.      Left: Normal. No mass, nipple discharge, skin change or tenderness.   Abdominal:      General: Bowel sounds are normal.      " Palpations: Abdomen is soft. Abdomen is not rigid. There is no mass.      Tenderness: There is no abdominal tenderness. There is no guarding.      Hernia: No hernia is present. There is no hernia in the left inguinal area or right inguinal area.   Genitourinary:     General: Normal vulva.      Exam position: Lithotomy position.      Pubic Area: No rash.       Labia:         Right: No rash, tenderness or lesion.         Left: No rash, tenderness or lesion.       Urethra: No urethral pain or urethral swelling.      Vagina: Normal. No vaginal discharge or lesions.      Cervix: No cervical motion tenderness, discharge, lesion or cervical bleeding.      Uterus: Normal. Not enlarged, not fixed and not tender.       Adnexa:         Right: No mass, tenderness or fullness.          Left: No mass, tenderness or fullness.        Rectum: No external hemorrhoid.   Musculoskeletal:      Cervical back: Normal range of motion. No muscular tenderness.   Neurological:      Mental Status: She is alert and oriented to person, place, and time.   Psychiatric:         Behavior: Behavior normal.            Assessment and Plan    Problem List Items Addressed This Visit          Family History    Family history of breast cancer    Overview     Diagnosed in patient's mother at age 48 and then patient's maternal aunt and age 52  Patient has mammograms at [facility]            Genitourinary and Reproductive     Women's annual routine gynecological examination - Primary    Overview     3/1/2021-Pap smear completed         Relevant Orders    LIQUID-BASED PAP SMEAR WITH HPV GENOTYPING REGARDLESS OF INTERPRETATION (MELY,COR,MAD)     Other Visit Diagnoses       Breast cancer screening by mammogram                GYN annual well woman exam.   Reviewed monthly self breast exams.  Instructed to call with lumps, pain, or breast discharge.  Yearly mammograms ordered.  Ordered mammogram today.  Recommended use of Vitamin D and getting  adequate calcium in her diet. (1500mg)  Reviewed BMI and weight loss as preventative health measures.   Symptoms of menopausal transition reviewed with patient.  Reviewed risks/benefits of OTC, non-hormonal and hormonal treatment for vasomotor and other menopausal symptoms.  Recommended Flu Vaccine in Fall of each year.  RTC in 1 year or PRN with problems.  Return in about 1 year (around 3/11/2025) for Annual physical.         Elda Byrnes MD  03/11/2024

## 2024-03-14 LAB — REF LAB TEST METHOD: NORMAL

## 2024-06-25 ENCOUNTER — OFFICE VISIT (OUTPATIENT)
Dept: FAMILY MEDICINE CLINIC | Facility: CLINIC | Age: 49
End: 2024-06-25
Payer: COMMERCIAL

## 2024-06-25 VITALS
HEART RATE: 72 BPM | HEIGHT: 67 IN | DIASTOLIC BLOOD PRESSURE: 80 MMHG | SYSTOLIC BLOOD PRESSURE: 120 MMHG | WEIGHT: 156 LBS | BODY MASS INDEX: 24.48 KG/M2 | OXYGEN SATURATION: 93 %

## 2024-06-25 DIAGNOSIS — R73.01 ELEVATED FASTING GLUCOSE: ICD-10-CM

## 2024-06-25 DIAGNOSIS — Z00.00 WELL ADULT EXAM: Primary | ICD-10-CM

## 2024-06-25 DIAGNOSIS — E55.9 VITAMIN D DEFICIENCY: ICD-10-CM

## 2024-06-25 DIAGNOSIS — R79.89 ELEVATED TSH: ICD-10-CM

## 2024-06-25 DIAGNOSIS — E53.8 B12 DEFICIENCY: ICD-10-CM

## 2024-06-25 DIAGNOSIS — R94.31 PROLONGED Q-T INTERVAL ON ECG: ICD-10-CM

## 2024-06-25 PROCEDURE — 93000 ELECTROCARDIOGRAM COMPLETE: CPT | Performed by: STUDENT IN AN ORGANIZED HEALTH CARE EDUCATION/TRAINING PROGRAM

## 2024-06-25 PROCEDURE — 99396 PREV VISIT EST AGE 40-64: CPT | Performed by: STUDENT IN AN ORGANIZED HEALTH CARE EDUCATION/TRAINING PROGRAM

## 2024-06-25 RX ORDER — OMEPRAZOLE 40 MG/1
40 CAPSULE, DELAYED RELEASE ORAL DAILY
Qty: 90 CAPSULE | Refills: 3 | Status: SHIPPED | OUTPATIENT
Start: 2024-06-25

## 2024-06-25 RX ORDER — LORATADINE PSEUDOEPHEDRINE SULFATE 10; 240 MG/1; MG/1
1 TABLET, EXTENDED RELEASE ORAL DAILY
Qty: 90 TABLET | Refills: 0 | Status: SHIPPED | OUTPATIENT
Start: 2024-06-25

## 2024-06-25 RX ORDER — LEVOTHYROXINE SODIUM 0.1 MG/1
100 TABLET ORAL DAILY
Qty: 90 TABLET | Refills: 3 | Status: SHIPPED | OUTPATIENT
Start: 2024-06-25

## 2024-06-25 NOTE — PROGRESS NOTES
"Chief Complaint  Annual Exam    Subjective          Aleksandra Raymond presents to [facility]  History of Present Illness    Patient presents the office today for annual physical.    She states overall she is doing very well at this time.  She states that when she is wearing flip-flops or shoes that are supportive that she has some increased swelling and redness of her feet.  She states that when she props her feet up she has resolution of her symptoms.    She also states that when she had a procedure done recently she was told that she had an abnormal EKG and would like to repeat this today.    She does need refills on her medications    She is up-to-date on well woman exam, mammogram and colon cancer screening.    Objective   Vital Signs:   /80   Pulse 72   Ht 170.2 cm (67\")   Wt 70.8 kg (156 lb)   SpO2 93%   BMI 24.43 kg/m²     Body mass index is 24.43 kg/m².    Review of Systems    Past History:  Medical History: has a past medical history of Abnormal ECG, Abnormal Pap smear of cervix, Acid reflux, Anxiety syndrome, Diabetes mellitus, Dysmenorrhea, Female infertility, History of Chiari malformation, HPV (human papilloma virus) infection, Hypothyroid, Migraine, PONV (postoperative nausea and vomiting), Tonsillitis, and Varicella.   Surgical History: has a past surgical history that includes Intrauterine device insertion (02/27/2019); Tonsillectomy; d & c with suction; [place] tooth extraction; and Hemorrhoid surgery.   Family History: family history includes Breast cancer (age of onset: 47) in her mother; Breast cancer (age of onset: 52) in her maternal aunt; Diabetes in her brother; Hypertension in her mother; Melanoma in her mother; Pancreatic cancer in her father; Prostate cancer in her father.   Social History: reports that she has never smoked. She has never used smokeless tobacco. She reports current alcohol use. She reports that she does not use " drugs.      Current Outpatient Medications:     levothyroxine (Euthyrox) 100 MCG tablet, Take 1 tablet by mouth Daily., Disp: 90 tablet, Rfl: 3    loratadine-pseudoephedrine (Claritin-D 24 Hour)  MG per 24 hr tablet, Take 1 tablet by mouth Daily., Disp: 90 tablet, Rfl: 0    omeprazole (priLOSEC) 40 MG capsule, Take 1 capsule by mouth Daily., Disp: 90 capsule, Rfl: 3    Allergies: Patient has no known allergies.    Physical Exam  Constitutional:       General: She is not in acute distress.     Appearance: She is not ill-appearing or toxic-appearing.   HENT:      Head: Normocephalic and atraumatic.   Cardiovascular:      Rate and Rhythm: Normal rate and regular rhythm.      Heart sounds: No murmur heard.  Pulmonary:      Effort: Pulmonary effort is normal. No respiratory distress.   Neurological:      General: No focal deficit present.      Mental Status: She is alert and oriented to person, place, and time.   Psychiatric:         Mood and Affect: Mood normal.         Thought Content: Thought content normal.          Result Review :            ECG 12 Lead    Date/Time: 6/25/2024 10:21 AM  Performed by: Harriett Escamilla DO    Authorized by: Harriett Escamilla DO  Comparison: compared with previous ECG   Comparison to previous ECG: Previous with mild prolonged QTc  Rhythm: sinus rhythm  Rate: normal  Conduction: conduction normal  ST Segments: ST segments normal  T Waves: T waves normal  QRS axis: normal  Other: no other findings    Clinical impression: normal ECG              Assessment and Plan    Diagnoses and all orders for this visit:    1. Well adult exam (Primary)  -     Comprehensive Metabolic Panel  -     CBC & Differential  -     Lipid Panel  -     TSH  -     T4, Free    2. Vitamin D deficiency  -     Vitamin D,25-Hydroxy    3. Elevated fasting glucose  -     Hemoglobin A1c    4. B12 deficiency  -     Vitamin B12 & Folate    Other orders  -     levothyroxine (Euthyrox) 100 MCG tablet; Take 1  tablet by mouth Daily.  Dispense: 90 tablet; Refill: 3  -     loratadine-pseudoephedrine (Claritin-D 24 Hour)  MG per 24 hr tablet; Take 1 tablet by mouth Daily.  Dispense: 90 tablet; Refill: 0  -     omeprazole (priLOSEC) 40 MG capsule; Take 1 capsule by mouth Daily.  Dispense: 90 capsule; Refill: 3  -     ECG 12 Lead    Blood work ordered and will contact with results when available. Will adjust medications based on abnormalities seen.     EKG today overall normal without any QTc prolongation.    Medications refilled at current doses.    Past medical and surgical history as well as allergies, family history and social history were reviewed, and discussed with patient.  Chronic conditions were reviewed as well as medications.   Anticipatory guidance handouts including healthy diet, health maintenance, as well as regular exercise and general instructions were given via [employer], and patient was able to ask questions and discuss any concerns.            Follow Up   No follow-ups on file.  Patient was given instructions and counseling regarding her condition or for health maintenance advice. Please see specific information pulled into the AVS if appropriate.     Harriett Escamilla, DO

## 2024-06-26 LAB
25(OH)D3+25(OH)D2 SERPL-MCNC: 77.7 NG/ML (ref 30–100)
ALBUMIN SERPL-MCNC: 4.8 G/DL (ref 3.8–4.9)
ALP SERPL-CCNC: 82 IU/L (ref 44–121)
ALT SERPL-CCNC: 12 IU/L (ref 0–32)
AST SERPL-CCNC: 17 IU/L (ref 0–40)
BASOPHILS # BLD AUTO: 0.1 X10E3/UL (ref 0–0.2)
BASOPHILS NFR BLD AUTO: 1 %
BILIRUB SERPL-MCNC: 0.3 MG/DL (ref 0–1.2)
BUN SERPL-MCNC: 22 MG/DL (ref 6–24)
BUN/CREAT SERPL: 22 (ref 9–23)
CALCIUM SERPL-MCNC: 9.8 MG/DL (ref 8.7–10.2)
CHLORIDE SERPL-SCNC: 99 MMOL/L (ref 96–106)
CHOLEST SERPL-MCNC: 242 MG/DL (ref 100–199)
CO2 SERPL-SCNC: 24 MMOL/L (ref 20–29)
CREAT SERPL-MCNC: 1.01 MG/DL (ref 0.57–1)
EGFRCR SERPLBLD CKD-EPI 2021: 67 ML/MIN/1.73
EOSINOPHIL # BLD AUTO: 0.2 X10E3/UL (ref 0–0.4)
EOSINOPHIL NFR BLD AUTO: 3 %
ERYTHROCYTE [DISTWIDTH] IN BLOOD BY AUTOMATED COUNT: 13 % (ref 11.7–15.4)
FOLATE SERPL-MCNC: >20 NG/ML
GLOBULIN SER CALC-MCNC: 2.8 G/DL (ref 1.5–4.5)
GLUCOSE SERPL-MCNC: 110 MG/DL (ref 70–99)
HBA1C MFR BLD: 6.5 % (ref 4.8–5.6)
HCT VFR BLD AUTO: 43.1 % (ref 34–46.6)
HDLC SERPL-MCNC: 58 MG/DL
HGB BLD-MCNC: 14.4 G/DL (ref 11.1–15.9)
IMM GRANULOCYTES # BLD AUTO: 0 X10E3/UL (ref 0–0.1)
IMM GRANULOCYTES NFR BLD AUTO: 0 %
LDLC SERPL CALC-MCNC: 166 MG/DL (ref 0–99)
LYMPHOCYTES # BLD AUTO: 2.7 X10E3/UL (ref 0.7–3.1)
LYMPHOCYTES NFR BLD AUTO: 37 %
MCH RBC QN AUTO: 29.8 PG (ref 26.6–33)
MCHC RBC AUTO-ENTMCNC: 33.4 G/DL (ref 31.5–35.7)
MCV RBC AUTO: 89 FL (ref 79–97)
MONOCYTES # BLD AUTO: 0.6 X10E3/UL (ref 0.1–0.9)
MONOCYTES NFR BLD AUTO: 8 %
NEUTROPHILS # BLD AUTO: 3.7 X10E3/UL (ref 1.4–7)
NEUTROPHILS NFR BLD AUTO: 51 %
PLATELET # BLD AUTO: 234 X10E3/UL (ref 150–450)
POTASSIUM SERPL-SCNC: 4.3 MMOL/L (ref 3.5–5.2)
PROT SERPL-MCNC: 7.6 G/DL (ref 6–8.5)
RBC # BLD AUTO: 4.83 X10E6/UL (ref 3.77–5.28)
SODIUM SERPL-SCNC: 138 MMOL/L (ref 134–144)
T4 FREE SERPL-MCNC: 1.33 NG/DL (ref 0.82–1.77)
TRIGL SERPL-MCNC: 104 MG/DL (ref 0–149)
TSH SERPL DL<=0.005 MIU/L-ACNC: 5.91 UIU/ML (ref 0.45–4.5)
VIT B12 SERPL-MCNC: 639 PG/ML (ref 232–1245)
VLDLC SERPL CALC-MCNC: 18 MG/DL (ref 5–40)
WBC # BLD AUTO: 7.3 X10E3/UL (ref 3.4–10.8)

## 2024-08-02 ENCOUNTER — LAB (OUTPATIENT)
Dept: FAMILY MEDICINE CLINIC | Facility: CLINIC | Age: 49
End: 2024-08-02
Payer: COMMERCIAL

## 2024-08-03 LAB
T4 FREE SERPL-MCNC: 1.38 NG/DL (ref 0.82–1.77)
TSH SERPL DL<=0.005 MIU/L-ACNC: 5.8 UIU/ML (ref 0.45–4.5)

## 2024-10-15 ENCOUNTER — LAB (OUTPATIENT)
Dept: FAMILY MEDICINE CLINIC | Facility: CLINIC | Age: 49
End: 2024-10-15
Payer: COMMERCIAL

## 2024-11-26 ENCOUNTER — TELEPHONE (OUTPATIENT)
Dept: FAMILY MEDICINE CLINIC | Facility: CLINIC | Age: 49
End: 2024-11-26
Payer: COMMERCIAL

## 2024-11-26 NOTE — TELEPHONE ENCOUNTER
Please let her know that this is available in the office or at the pharmacy and she can walk in to have this done.

## 2024-11-26 NOTE — TELEPHONE ENCOUNTER
Caller: Aleksandra Raymond    Relationship: Self    Best call back number: [phone]    What orders are you requesting (i.e. lab or imaging): SHINGLES VACCINE     In what timeframe would the patient need to come in: ASAP       Additional notes: PATIENT IS WANTING SHINGLES VACCINE.

## 2024-12-19 ENCOUNTER — TRANSCRIBE ORDERS (OUTPATIENT)
Dept: ADMINISTRATIVE | Facility: HOSPITAL | Age: 49
End: 2024-12-19
Payer: COMMERCIAL

## 2024-12-19 DIAGNOSIS — Z12.31 VISIT FOR SCREENING MAMMOGRAM: Primary | ICD-10-CM

## 2024-12-30 RX ORDER — LORATADINE PSEUDOEPHEDRINE SULFATE 10; 240 MG/1; MG/1
1 TABLET, EXTENDED RELEASE ORAL DAILY
Qty: 90 TABLET | Refills: 0 | Status: SHIPPED | OUTPATIENT
Start: 2024-12-30

## 2025-01-24 ENCOUNTER — HOSPITAL ENCOUNTER (OUTPATIENT)
Dept: MAMMOGRAPHY | Facility: HOSPITAL | Age: 50
Discharge: HOME | End: 2025-01-24
Admitting: OBSTETRICS & GYNECOLOGY
Payer: COMMERCIAL

## 2025-01-24 DIAGNOSIS — Z12.31 VISIT FOR SCREENING MAMMOGRAM: ICD-10-CM

## 2025-01-24 PROCEDURE — 77063 BREAST TOMOSYNTHESIS BI: CPT

## 2025-01-24 PROCEDURE — 77067 SCR MAMMO BI INCL CAD: CPT

## 2025-01-28 RX ORDER — LEVOTHYROXINE SODIUM 112 UG/1
112 TABLET ORAL DAILY
Qty: 30 TABLET | Refills: 0 | Status: SHIPPED | OUTPATIENT
Start: 2025-01-28

## 2025-01-29 NOTE — TELEPHONE ENCOUNTER
Called and spoke to the patient and was able to get her scheduled for an appointment with Dr. Escamilla for 2/25

## 2025-02-25 ENCOUNTER — OFFICE VISIT (OUTPATIENT)
Dept: FAMILY MEDICINE CLINIC | Facility: CLINIC | Age: 50
End: 2025-02-25
Payer: COMMERCIAL

## 2025-02-25 VITALS
WEIGHT: 164 LBS | HEIGHT: 67 IN | BODY MASS INDEX: 25.74 KG/M2 | OXYGEN SATURATION: 97 % | SYSTOLIC BLOOD PRESSURE: 104 MMHG | DIASTOLIC BLOOD PRESSURE: 68 MMHG | HEART RATE: 95 BPM

## 2025-02-25 DIAGNOSIS — K59.09 OTHER CONSTIPATION: ICD-10-CM

## 2025-02-25 DIAGNOSIS — E03.9 HYPOTHYROIDISM, UNSPECIFIED TYPE: Primary | ICD-10-CM

## 2025-02-25 DIAGNOSIS — Z87.898 HISTORY OF PREDIABETES: ICD-10-CM

## 2025-02-25 DIAGNOSIS — Z13.9 SCREENING DUE: ICD-10-CM

## 2025-02-25 DIAGNOSIS — K58.1 IRRITABLE BOWEL SYNDROME WITH CONSTIPATION: ICD-10-CM

## 2025-02-25 PROCEDURE — 99214 OFFICE O/P EST MOD 30 MIN: CPT | Performed by: STUDENT IN AN ORGANIZED HEALTH CARE EDUCATION/TRAINING PROGRAM

## 2025-02-25 RX ORDER — LEVOTHYROXINE SODIUM 112 UG/1
112 TABLET ORAL DAILY
Qty: 90 TABLET | Refills: 1 | Status: SHIPPED | OUTPATIENT
Start: 2025-02-25

## 2025-02-25 NOTE — PROGRESS NOTES
"Chief Complaint  Follow-up (Follow up on thyroid. Pt also wants to talk about menopausal symptoms. )    Subjective          Aleksandra Raymond presents to [facility]  History of Present Illness  Patient presents for thyroid re-check.  Patient states that she has not had a period in a couple years and is now experiencing some menopausal symptoms.  She complains of brain fog and increased appetite and weight gain.    Patient also reports struggling with constipation without full evacuation of stool even using MiraLAX.  She states that she has had IBS for most of her life but since menopause it has become IBS-C.  Patient is requesting some medication to help with constipation.  She states that MiraLAX helps her go to the bathroom but not fully empty her colon.    Objective   Vital Signs:   /68   Pulse 95   Ht 170.2 cm (67\")   Wt 74.4 kg (164 lb)   SpO2 97%   BMI 25.69 kg/m²     Body mass index is 25.69 kg/m².    Review of Systems   Constitutional:  Positive for fatigue.   HENT: Negative.     Eyes: Negative.    Respiratory: Negative.     Cardiovascular: Negative.    Gastrointestinal:  Positive for constipation.        IBSC   Endocrine: Negative.    Genitourinary: Negative.    Musculoskeletal: Negative.    Allergic/Immunologic: Negative.    Neurological: Negative.    Hematological: Negative.        Past History:  Medical History: has a past medical history of Abnormal ECG, Abnormal Pap smear of cervix, Acid reflux, Allergic, Anxiety syndrome, Diabetes mellitus, Dysmenorrhea, Female infertility, History of Chiari malformation, HPV (human papilloma virus) infection, Hypothyroid, Irritable bowel syndrome, Migraine, PONV (postoperative nausea and vomiting), Tonsillitis, and Varicella.   Surgical History: has a past surgical history that includes Intrauterine device insertion (02/27/2019); Tonsillectomy; d & c with suction; [place] tooth extraction; Hemorrhoid surgery; Cosmetic " surgery; and Eye surgery.   Family History: family history includes Arthritis in her mother; Breast cancer (age of onset: 47) in her mother; Breast cancer (age of onset: 52) in her maternal aunt; Cancer in her father, maternal aunt, mother, and paternal grandfather; Diabetes in her brother and paternal grandmother; Heart disease in her paternal grandmother; Hypertension in her mother; Kidney disease in her mother; Melanoma in her mother; Pancreatic cancer in her father; Prostate cancer in her father; Thyroid disease in her maternal uncle.   Social History: reports that she has never smoked. She has never used smokeless tobacco. She reports current alcohol use. She reports that she does not use drugs.      Current Outpatient Medications:     levothyroxine (SYNTHROID, LEVOTHROID) 112 MCG tablet, Take 1 tablet by mouth Daily., Disp: 90 tablet, Rfl: 1    linaclotide (Linzess) 72 MCG capsule capsule, Take 1 capsule by mouth Every Morning Before Breakfast., Disp: 30 capsule, Rfl: 1    loratadine-pseudoephedrine (Claritin-D 24 Hour)  MG per 24 hr tablet, Take 1 tablet by mouth Daily., Disp: 90 tablet, Rfl: 0    omeprazole (priLOSEC) 40 MG capsule, Take 1 capsule by mouth Daily., Disp: 90 capsule, Rfl: 3    Allergies: Patient has no known allergies.    Physical Exam  Constitutional:       Appearance: Normal appearance. She is normal weight.   HENT:      Head: Normocephalic and atraumatic.      Right Ear: Tympanic membrane normal.      Left Ear: Tympanic membrane normal.      Mouth/Throat:      Mouth: Mucous membranes are dry.   Eyes:      Pupils: Pupils are equal, round, and reactive to light.   Cardiovascular:      Rate and Rhythm: Normal rate and regular rhythm.      Pulses: Normal pulses.      Heart sounds: Normal heart sounds.   Pulmonary:      Effort: Pulmonary effort is normal.      Breath sounds: Normal breath sounds.   Abdominal:      Palpations: Abdomen is soft.   Musculoskeletal:         General: Normal  range of motion.      Cervical back: Normal range of motion and neck supple.   Skin:     General: Skin is warm.      Capillary Refill: Capillary refill takes 2 to 3 seconds.   Neurological:      General: No focal deficit present.      Mental Status: She is alert and oriented to person, place, and time.   Psychiatric:         Mood and Affect: Mood normal.         Behavior: Behavior normal.        Physical exam negative for abnormalities.  Result Review :                   Assessment and Plan    Diagnoses and all orders for this visit:    1. Hypothyroidism, unspecified type (Primary)  -     levothyroxine (SYNTHROID, LEVOTHROID) 112 MCG tablet; Take 1 tablet by mouth Daily.  Dispense: 90 tablet; Refill: 1    2. Screening due  -     CBC & Differential  -     Lipid Panel  -     TSH  -     T3, Free  -     T4, Free    3. History of prediabetes  -     Hemoglobin A1c    4. Other constipation  -     linaclotide (Linzess) 72 MCG capsule capsule; Take 1 capsule by mouth Every Morning Before Breakfast.  Dispense: 30 capsule; Refill: 1    5. Irritable bowel syndrome with constipation    Blood work ordered and will contact with results when available. Will adjust medications based on abnormalities seen.     Medications refilled at current doses they are doing well at this time. No dose adjustments in the office today.     Will add Linzess 72 mcg that she has had failure of over-the-counter and conservative medications for IBS-C.        Follow Up   Patient to follow-up in 6 months.    Patient was given instructions and counseling regarding her condition or for health maintenance advice. Please see specific information pulled into the AVS if appropriate.     Harriett Escamilla, DO

## 2025-02-26 DIAGNOSIS — E03.9 HYPOTHYROIDISM, UNSPECIFIED TYPE: ICD-10-CM

## 2025-02-26 LAB
BASOPHILS # BLD AUTO: 0.1 X10E3/UL (ref 0–0.2)
BASOPHILS NFR BLD AUTO: 1 %
CHOLEST SERPL-MCNC: 229 MG/DL (ref 100–199)
EOSINOPHIL # BLD AUTO: 0.2 X10E3/UL (ref 0–0.4)
EOSINOPHIL NFR BLD AUTO: 2 %
ERYTHROCYTE [DISTWIDTH] IN BLOOD BY AUTOMATED COUNT: 14.9 % (ref 11.7–15.4)
HBA1C MFR BLD: 7.1 % (ref 4.8–5.6)
HCT VFR BLD AUTO: 41.1 % (ref 34–46.6)
HDLC SERPL-MCNC: 56 MG/DL
HGB BLD-MCNC: 13 G/DL (ref 11.1–15.9)
IMM GRANULOCYTES # BLD AUTO: 0 X10E3/UL (ref 0–0.1)
IMM GRANULOCYTES NFR BLD AUTO: 0 %
LDLC SERPL CALC-MCNC: 141 MG/DL (ref 0–99)
LYMPHOCYTES # BLD AUTO: 2.8 X10E3/UL (ref 0.7–3.1)
LYMPHOCYTES NFR BLD AUTO: 35 %
MCH RBC QN AUTO: 26.4 PG (ref 26.6–33)
MCHC RBC AUTO-ENTMCNC: 31.6 G/DL (ref 31.5–35.7)
MCV RBC AUTO: 84 FL (ref 79–97)
MONOCYTES # BLD AUTO: 0.6 X10E3/UL (ref 0.1–0.9)
MONOCYTES NFR BLD AUTO: 8 %
NEUTROPHILS # BLD AUTO: 4.2 X10E3/UL (ref 1.4–7)
NEUTROPHILS NFR BLD AUTO: 54 %
PLATELET # BLD AUTO: 248 X10E3/UL (ref 150–450)
RBC # BLD AUTO: 4.92 X10E6/UL (ref 3.77–5.28)
T3FREE SERPL-MCNC: 2.6 PG/ML (ref 2–4.4)
T4 FREE SERPL-MCNC: 1.44 NG/DL (ref 0.82–1.77)
TRIGL SERPL-MCNC: 180 MG/DL (ref 0–149)
TSH SERPL DL<=0.005 MIU/L-ACNC: 3.48 UIU/ML (ref 0.45–4.5)
VLDLC SERPL CALC-MCNC: 32 MG/DL (ref 5–40)
WBC # BLD AUTO: 7.9 X10E3/UL (ref 3.4–10.8)

## 2025-02-27 RX ORDER — LEVOTHYROXINE SODIUM 112 UG/1
112 TABLET ORAL DAILY
Qty: 90 TABLET | Refills: 1 | OUTPATIENT
Start: 2025-02-27

## 2025-03-13 ENCOUNTER — OFFICE VISIT (OUTPATIENT)
Dept: OBSTETRICS AND GYNECOLOGY | Facility: CLINIC | Age: 50
End: 2025-03-13
Payer: COMMERCIAL

## 2025-03-13 VITALS
HEIGHT: 67 IN | SYSTOLIC BLOOD PRESSURE: 104 MMHG | DIASTOLIC BLOOD PRESSURE: 62 MMHG | WEIGHT: 165.4 LBS | BODY MASS INDEX: 25.96 KG/M2

## 2025-03-13 DIAGNOSIS — Z12.31 BREAST CANCER SCREENING BY MAMMOGRAM: Primary | ICD-10-CM

## 2025-03-13 DIAGNOSIS — N94.10 FEMALE DYSPAREUNIA: ICD-10-CM

## 2025-03-13 DIAGNOSIS — Z01.419 WELL WOMAN EXAM WITH ROUTINE GYNECOLOGICAL EXAM: ICD-10-CM

## 2025-03-13 PROBLEM — N92.6 IRREGULAR PERIODS: Status: RESOLVED | Noted: 2021-03-01 | Resolved: 2025-03-13

## 2025-03-13 NOTE — PROGRESS NOTES
Gynecologic Annual Exam Note        GYN Annual Exam     CC - Here for annual exam.        HPI  Aleksandra Raymond is a 49 y.o. female, , who presents for annual well woman exam as a established patient.  She is postmenopausal.Pt states her last period was in 2022. Denies vaginal bleeding.   There were no changes to her medical or surgical history since her last visit. Marital Status: .  She is sexually active. She has not had new partners.. STD testing recommendations have been explained to the patient and she declines STD testing.    The patient would like to discuss the following complaints today: Pt reports brain fog, insomnia, weight gain(she was 154lbs at last annual), increased appetite, hot flashes(taking black cohosh and that is helping), vaginal dryness and dyspareunia(lubricant has not helped) for the 2 years but gotten worse for past 1 year. Pt states she took OTC estroven for 1 week but stopped due to digestive issues.     Additional OB/GYN History   On HRT? no    Last Pap : 3/11/2024. Results: negative. HPV: negative.   Last Completed Pap Smear            Upcoming       PAP SMEAR (Every 3 Years) Next due on 3/11/2027      2024  LIQUID-BASED PAP SMEAR WITH HPV GENOTYPING REGARDLESS OF INTERPRETATION (MELY,COR,MAD)    2021  Pap IG, HPV-hr    2020  Done - negative                          History of abnormal Pap smear: yes - many years ago  Family history of uterine, colon, breast, or ovarian cancer: yes - mother, maternal aunt-breast  Performs monthly Self-Breast Exam: sometimes  Last mammogram: 2025. Done at . There is a copy in the chart.    Last Completed Mammogram            Awaiting Completion       MAMMOGRAM (Every 2 Years) Order placed this encounter      2025  Order placed for Mammo Screening Digital Tomosynthesis Bilateral With CAD by Elda Byrnes MD    2025  Mammo Screening Digital Tomosynthesis Bilateral With CAD     2024  Mammo Screening Digital Tomosynthesis Bilateral With CAD    2022  Mammo Screening Digital Tomosynthesis Bilateral With CAD    2021  Mammo Diagnostic Digital Tomosynthesis Bilateral With CAD     Only the first 5 history entries have been loaded, but more history exists.                        Last colonoscopy: has had a colonoscopy 7-8 years ago    Last Completed Colonoscopy    This patient has no relevant Health Maintenance data.         She has never had a bone density scan  Exercises Regularly: yes  Feelings of Anxiety or Depression: no      Tobacco Usage?: No       Current Outpatient Medications:     BLACK COHOSH PO, Take  by mouth., Disp: , Rfl:     levothyroxine (SYNTHROID, LEVOTHROID) 112 MCG tablet, Take 1 tablet by mouth Daily., Disp: 90 tablet, Rfl: 1    linaclotide (Linzess) 72 MCG capsule capsule, Take 1 capsule by mouth Every Morning Before Breakfast., Disp: 30 capsule, Rfl: 1    loratadine-pseudoephedrine (Claritin-D 24 Hour)  MG per 24 hr tablet, Take 1 tablet by mouth Daily., Disp: 90 tablet, Rfl: 0    melatonin 5 MG tablet tablet, Take  by mouth., Disp: , Rfl:     omeprazole (priLOSEC) 40 MG capsule, Take 1 capsule by mouth Daily., Disp: 90 capsule, Rfl: 3    Patient denies the need for medication refills today.    OB History          1    Para   1    Term   1            AB        Living             SAB        IAB        Ectopic        Molar        Multiple        Live Births   1                Past Medical History:   Diagnosis Date    Abnormal ECG     Abnormal Pap smear of cervix     Don't remember    Acid reflux     Allergic     Seasonal    Anxiety syndrome     DRUG THERAPY    Diabetes mellitus     Dysmenorrhea     Female infertility     History of Chiari malformation     NEURO REFERRAL    HPV (human papilloma virus) infection     In your records, I don't remember.    Hyperlipidemia     Slightly    Hypothyroid     Irritable bowel syndrome     Migraine   "   PONV (postoperative nausea and vomiting)     Tonsillitis     Varicella         Past Surgical History:   Procedure Laterality Date    COSMETIC SURGERY      D & C WITH SUCTION      EYE SURGERY      Lasix    HEMORRHOIDECTOMY      INTRAUTERINE DEVICE INSERTION  02/27/2019    TONSILLECTOMY      WISDOM TOOTH EXTRACTION         Health Maintenance   Topic Date Due    BMI FOLLOWUP  Never done    COLORECTAL CANCER SCREENING  12/29/2024    Annual Gynecologic Pelvic and Breast Exam  03/12/2025    INFLUENZA VACCINE  03/31/2025 (Originally 7/1/2024)    COVID-19 Vaccine (5 - 2024-25 season) 05/17/2025 (Originally 9/1/2024)    Pneumococcal Vaccine 50+ (1 of 1 - PCV) 02/25/2026 (Originally 3/21/2023)    ZOSTER VACCINE (2 of 2) 03/28/2025    ANNUAL PHYSICAL  06/25/2025    LIPID PANEL  02/25/2026    MAMMOGRAM  01/24/2027    PAP SMEAR  03/11/2027    TDAP/TD VACCINES (2 - Tdap) 06/22/2033    HEPATITIS C SCREENING  Completed       The additional following portions of the patient's history were reviewed and updated as appropriate: allergies, current medications, past family history, past medical history, past social history, past surgical history, and problem list.    Review of Systems   Constitutional:  Positive for unexpected weight gain.   HENT: Negative.     Eyes: Negative.    Respiratory: Negative.     Cardiovascular: Negative.    Gastrointestinal: Negative.    Endocrine: Negative.    Genitourinary:  Positive for dyspareunia.        Vaginal dryness   Musculoskeletal: Negative.    Skin: Negative.    Allergic/Immunologic: Negative.    Neurological: Negative.         Brain fog   Hematological: Negative.    Psychiatric/Behavioral:  Positive for sleep disturbance.        I have reviewed and agree with the HPI, ROS, and historical information as entered above. Elda Byrnes MD      Objective   /62   Ht 170.2 cm (67\")   Wt 75 kg (165 lb 6.4 oz)   LMP  (Within Years)   BMI 25.91 kg/m²     Physical Exam  Vitals and " nursing note reviewed. Exam conducted with a chaperone present.   Constitutional:       Appearance: She is well-developed.   HENT:      Head: Normocephalic and atraumatic.   Neck:      Thyroid: No thyroid mass or thyromegaly.   Cardiovascular:      Rate and Rhythm: Normal rate and regular rhythm.      Heart sounds: No murmur heard.  Pulmonary:      Effort: Pulmonary effort is normal. No retractions.      Breath sounds: Normal breath sounds. No wheezing, rhonchi or rales.   Chest:      Chest wall: No mass or tenderness.   Breasts:     Right: Normal. No mass, nipple discharge, skin change or tenderness.      Left: Normal. No mass, nipple discharge, skin change or tenderness.   Abdominal:      General: Bowel sounds are normal.      Palpations: Abdomen is soft. Abdomen is not rigid. There is no mass.      Tenderness: There is no abdominal tenderness. There is no guarding.      Hernia: No hernia is present. There is no hernia in the left inguinal area or right inguinal area.   Genitourinary:     General: Normal vulva.      Exam position: Lithotomy position.      Pubic Area: No rash.       Labia:         Right: No rash, tenderness or lesion.         Left: No rash, tenderness or lesion.       Urethra: No urethral pain or urethral swelling.      Vagina: Normal. No vaginal discharge or lesions.      Cervix: No cervical motion tenderness, discharge, lesion or cervical bleeding.      Uterus: Normal. Not enlarged, not fixed and not tender.       Adnexa:         Right: No mass, tenderness or fullness.          Left: No mass, tenderness or fullness.        Rectum: No external hemorrhoid.      Comments: Tight musculature around vaginal introitus  Musculoskeletal:      Cervical back: Normal range of motion. No muscular tenderness.   Neurological:      Mental Status: She is alert and oriented to person, place, and time.   Psychiatric:         Behavior: Behavior normal.            Assessment and Plan    Problem List Items Addressed  This Visit          Genitourinary and Reproductive     Female dyspareunia          Overview    3/13/2025-patient with introital dyspareunia.  She is trying lubricants but still struggling.  She is anticipating pain each time she has intercourse.  On exam she has tightened musculature at the vaginal introitus.  We discussed vaginal estrogen along with pelvic floor physical therapy.  Patient not interested in trying those at this time.  She will reach out if she changes her mind.  We discussed position changes and Replens to also help.       Other Visit Diagnoses         Breast cancer screening by mammogram    -  Primary    Relevant Orders    Mammo Screening Digital Tomosynthesis Bilateral With CAD      Well woman exam with routine gynecological exam                GYN annual well woman exam.   Reviewed monthly self breast exams.  Instructed to call with lumps, pain, or breast discharge.  Yearly mammograms ordered.  Ordered mammogram today.  Recommended use of Vitamin D and getting adequate calcium in her diet. (1500mg)  Reviewed exercise as a preventative health measures.   Reviewed BMI and weight loss as preventative health measures.   Recommended Flu Vaccine in Fall of each year.  RTC in 1 year or PRN with problems.  Other: Patient not interested in systemic hormone replacement therapy.  Return in about 1 year (around 3/13/2026) for Annual physical.         Elda Byrnes MD  03/13/2025

## 2025-04-28 DIAGNOSIS — K59.09 OTHER CONSTIPATION: ICD-10-CM

## 2025-07-01 RX ORDER — OMEPRAZOLE 40 MG/1
40 CAPSULE, DELAYED RELEASE ORAL DAILY
Qty: 30 CAPSULE | Refills: 0 | Status: SHIPPED | OUTPATIENT
Start: 2025-07-01

## 2025-08-06 ENCOUNTER — OFFICE VISIT (OUTPATIENT)
Dept: FAMILY MEDICINE CLINIC | Facility: CLINIC | Age: 50
End: 2025-08-06
Payer: COMMERCIAL

## 2025-08-06 VITALS
HEART RATE: 66 BPM | OXYGEN SATURATION: 98 % | DIASTOLIC BLOOD PRESSURE: 78 MMHG | SYSTOLIC BLOOD PRESSURE: 120 MMHG | BODY MASS INDEX: 26.37 KG/M2 | WEIGHT: 168 LBS | HEIGHT: 67 IN

## 2025-08-06 DIAGNOSIS — K64.9 HEMORRHOIDS, UNSPECIFIED HEMORRHOID TYPE: ICD-10-CM

## 2025-08-06 DIAGNOSIS — K59.09 OTHER CONSTIPATION: ICD-10-CM

## 2025-08-06 DIAGNOSIS — Z87.898 HISTORY OF PREDIABETES: ICD-10-CM

## 2025-08-06 DIAGNOSIS — E03.9 HYPOTHYROIDISM, UNSPECIFIED TYPE: ICD-10-CM

## 2025-08-06 DIAGNOSIS — E53.8 B12 DEFICIENCY: ICD-10-CM

## 2025-08-06 DIAGNOSIS — R94.31 PROLONGED Q-T INTERVAL ON ECG: ICD-10-CM

## 2025-08-06 DIAGNOSIS — Z00.00 WELL ADULT EXAM: Primary | ICD-10-CM

## 2025-08-06 DIAGNOSIS — M65.341 TRIGGER RING FINGER OF RIGHT HAND: ICD-10-CM

## 2025-08-06 DIAGNOSIS — E55.9 VITAMIN D DEFICIENCY: ICD-10-CM

## 2025-08-06 RX ORDER — HYDROCORTISONE 25 MG/G
1 OINTMENT TOPICAL 2 TIMES DAILY
Qty: 20 G | Refills: 1 | Status: SHIPPED | OUTPATIENT
Start: 2025-08-06

## 2025-08-06 RX ORDER — LORATADINE PSEUDOEPHEDRINE SULFATE 10; 240 MG/1; MG/1
1 TABLET, EXTENDED RELEASE ORAL DAILY
Qty: 90 TABLET | Refills: 0 | Status: SHIPPED | OUTPATIENT
Start: 2025-08-06

## 2025-08-06 RX ORDER — OMEPRAZOLE 40 MG/1
40 CAPSULE, DELAYED RELEASE ORAL DAILY
Qty: 90 CAPSULE | Refills: 1 | Status: SHIPPED | OUTPATIENT
Start: 2025-08-06

## 2025-08-06 RX ORDER — LEVOTHYROXINE SODIUM 112 UG/1
112 TABLET ORAL DAILY
Qty: 90 TABLET | Refills: 1 | Status: SHIPPED | OUTPATIENT
Start: 2025-08-06

## 2025-08-07 LAB
25(OH)D3+25(OH)D2 SERPL-MCNC: 80.1 NG/ML (ref 30–100)
ALBUMIN SERPL-MCNC: 4.4 G/DL (ref 3.8–4.9)
ALP SERPL-CCNC: 99 IU/L (ref 44–121)
ALT SERPL-CCNC: 13 IU/L (ref 0–32)
AST SERPL-CCNC: 19 IU/L (ref 0–40)
BASOPHILS # BLD AUTO: 0.1 X10E3/UL (ref 0–0.2)
BASOPHILS NFR BLD AUTO: 1 %
BILIRUB SERPL-MCNC: 0.3 MG/DL (ref 0–1.2)
BUN SERPL-MCNC: 20 MG/DL (ref 6–24)
BUN/CREAT SERPL: 22 (ref 9–23)
CALCIUM SERPL-MCNC: 9.2 MG/DL (ref 8.7–10.2)
CHLORIDE SERPL-SCNC: 102 MMOL/L (ref 96–106)
CHOLEST SERPL-MCNC: 209 MG/DL (ref 100–199)
CO2 SERPL-SCNC: 23 MMOL/L (ref 20–29)
CREAT SERPL-MCNC: 0.92 MG/DL (ref 0.57–1)
EGFRCR SERPLBLD CKD-EPI 2021: 75 ML/MIN/1.73
EOSINOPHIL # BLD AUTO: 0.3 X10E3/UL (ref 0–0.4)
EOSINOPHIL NFR BLD AUTO: 4 %
ERYTHROCYTE [DISTWIDTH] IN BLOOD BY AUTOMATED COUNT: 17.6 % (ref 11.7–15.4)
GLOBULIN SER CALC-MCNC: 2.8 G/DL (ref 1.5–4.5)
GLUCOSE SERPL-MCNC: 131 MG/DL (ref 70–99)
HBA1C MFR BLD: 7 % (ref 4.8–5.6)
HCT VFR BLD AUTO: 40.3 % (ref 34–46.6)
HDLC SERPL-MCNC: 54 MG/DL
HGB BLD-MCNC: 12.4 G/DL (ref 11.1–15.9)
IMM GRANULOCYTES # BLD AUTO: 0.1 X10E3/UL (ref 0–0.1)
IMM GRANULOCYTES NFR BLD AUTO: 1 %
LDLC SERPL CALC-MCNC: 136 MG/DL (ref 0–99)
LYMPHOCYTES # BLD AUTO: 2.4 X10E3/UL (ref 0.7–3.1)
LYMPHOCYTES NFR BLD AUTO: 35 %
MCH RBC QN AUTO: 25.5 PG (ref 26.6–33)
MCHC RBC AUTO-ENTMCNC: 30.8 G/DL (ref 31.5–35.7)
MCV RBC AUTO: 83 FL (ref 79–97)
MONOCYTES # BLD AUTO: 0.6 X10E3/UL (ref 0.1–0.9)
MONOCYTES NFR BLD AUTO: 8 %
NEUTROPHILS # BLD AUTO: 3.5 X10E3/UL (ref 1.4–7)
NEUTROPHILS NFR BLD AUTO: 51 %
PLATELET # BLD AUTO: 239 X10E3/UL (ref 150–450)
POTASSIUM SERPL-SCNC: 4.5 MMOL/L (ref 3.5–5.2)
PROT SERPL-MCNC: 7.2 G/DL (ref 6–8.5)
RBC # BLD AUTO: 4.86 X10E6/UL (ref 3.77–5.28)
SODIUM SERPL-SCNC: 139 MMOL/L (ref 134–144)
T3FREE SERPL-MCNC: 2.8 PG/ML (ref 2–4.4)
T4 FREE SERPL-MCNC: 1.59 NG/DL (ref 0.82–1.77)
TRIGL SERPL-MCNC: 106 MG/DL (ref 0–149)
TSH SERPL DL<=0.005 MIU/L-ACNC: 1.92 UIU/ML (ref 0.45–4.5)
VLDLC SERPL CALC-MCNC: 19 MG/DL (ref 5–40)
WBC # BLD AUTO: 6.8 X10E3/UL (ref 3.4–10.8)